# Patient Record
Sex: MALE | Race: WHITE | Employment: OTHER | URBAN - METROPOLITAN AREA
[De-identification: names, ages, dates, MRNs, and addresses within clinical notes are randomized per-mention and may not be internally consistent; named-entity substitution may affect disease eponyms.]

---

## 2017-02-17 PROBLEM — I25.10 CORONARY ARTERY DISEASE INVOLVING NATIVE CORONARY ARTERY OF NATIVE HEART WITHOUT ANGINA PECTORIS: Status: ACTIVE | Noted: 2017-02-17

## 2017-02-22 ENCOUNTER — HOSPITAL ENCOUNTER (OUTPATIENT)
Dept: CV DIAGNOSTICS | Facility: HOSPITAL | Age: 72
Discharge: HOME | End: 2017-02-22
Attending: INTERNAL MEDICINE
Payer: MEDICARE

## 2017-02-22 DIAGNOSIS — I25.10 CORONARY ARTERY DISEASE INVOLVING NATIVE CORONARY ARTERY OF NATIVE HEART WITHOUT ANGINA PECTORIS: ICD-10-CM

## 2017-02-22 DIAGNOSIS — G20 PARKINSON'S DISEASE (HCC): ICD-10-CM

## 2017-02-22 PROCEDURE — 78452 HT MUSCLE IMAGE SPECT MULT: CPT | Performed by: INTERNAL MEDICINE

## 2017-02-22 PROCEDURE — 93017 CV STRESS TEST TRACING ONLY: CPT

## 2017-02-22 PROCEDURE — 78452 HT MUSCLE IMAGE SPECT MULT: CPT

## 2017-02-22 PROCEDURE — 93018 CV STRESS TEST I&R ONLY: CPT | Performed by: INTERNAL MEDICINE

## 2017-06-23 ENCOUNTER — LAB ENCOUNTER (OUTPATIENT)
Dept: LAB | Age: 72
End: 2017-06-23
Attending: ALLERGY & IMMUNOLOGY
Payer: MEDICARE

## 2017-06-23 DIAGNOSIS — J32.9 RECURRENT SINUSITIS: Primary | ICD-10-CM

## 2017-06-23 PROCEDURE — 36415 COLL VENOUS BLD VENIPUNCTURE: CPT

## 2017-06-23 PROCEDURE — 85025 COMPLETE CBC W/AUTO DIFF WBC: CPT

## 2017-06-23 PROCEDURE — 82784 ASSAY IGA/IGD/IGG/IGM EACH: CPT

## 2017-08-03 ENCOUNTER — PATIENT MESSAGE (OUTPATIENT)
Dept: FAMILY MEDICINE CLINIC | Facility: CLINIC | Age: 72
End: 2017-08-03

## 2017-08-03 RX ORDER — LOSARTAN POTASSIUM 25 MG/1
25 TABLET ORAL DAILY
Qty: 30 TABLET | Refills: 0 | Status: SHIPPED | OUTPATIENT
Start: 2017-08-03 | End: 2017-08-03

## 2017-08-03 RX ORDER — LOSARTAN POTASSIUM 25 MG/1
TABLET ORAL
Qty: 90 TABLET | Refills: 0 | Status: SHIPPED | OUTPATIENT
Start: 2017-08-03 | End: 2017-11-03

## 2017-08-03 NOTE — TELEPHONE ENCOUNTER
From: Kiera Marroquin  To: Concetta Haines MD  Sent: 8/3/2017 11:55 AM CDT  Subject: Non-Urgent Medical Question    Dr. Guzman Segovia,    We just came back from a visit with [facility]'s Allergist regarding his chronic cough and he suggested that I ask you if it woul

## 2017-08-03 NOTE — TELEPHONE ENCOUNTER
Certainly could be. I will call in an alternative  We have an appt in 1 mo. Will discuss then  Stop the lisinopril right now.

## 2017-08-03 NOTE — TELEPHONE ENCOUNTER
Received refill request for Losartan Potassium 25mg from [facility] in [place]. This medication was just ordered today, 8/3/17,  #30 with 0 additional refills by Lexi Souza as a replacement for lisinopril that may have been causing a cough.   Tim Aguiar has a M

## 2017-08-08 ENCOUNTER — PATIENT OUTREACH (OUTPATIENT)
Dept: CASE MANAGEMENT | Age: 72
End: 2017-08-08

## 2017-08-08 NOTE — PROGRESS NOTES
Spoke to pt's wife (on HIPAA) to intro CCM. Pt's wife is interested in having pt enroll in CCM, but wants to be able to go over the information with him so he can have more of a say in his care before he would verbally consent.   Information being sent in

## 2017-08-21 DIAGNOSIS — E11.29 TYPE 2 DIABETES MELLITUS WITH MICROALBUMINURIA, WITHOUT LONG-TERM CURRENT USE OF INSULIN (HCC): ICD-10-CM

## 2017-08-21 DIAGNOSIS — R80.9 TYPE 2 DIABETES MELLITUS WITH MICROALBUMINURIA, WITHOUT LONG-TERM CURRENT USE OF INSULIN (HCC): ICD-10-CM

## 2017-08-21 RX ORDER — LISINOPRIL 2.5 MG/1
TABLET ORAL
Qty: 90 TABLET | Refills: 0 | Status: SHIPPED | OUTPATIENT
Start: 2017-08-21 | End: 2017-08-30

## 2017-08-21 NOTE — TELEPHONE ENCOUNTER
Pending Prescriptions Disp Refills    LISINOPRIL 2.5 MG Oral Tab [Pharmacy Med Name: LISINOPRIL 2.5MG TABLETS] 90 tablet 0     Sig: TAKE 1 TABLET(2.5 MG) BY MOUTH DAILY           LOV 8/30/2016    Future Appointments  Date Time Provider Peewee Wilkinson

## 2017-08-22 ENCOUNTER — PATIENT OUTREACH (OUTPATIENT)
Dept: CASE MANAGEMENT | Age: 72
End: 2017-08-22

## 2017-08-23 ENCOUNTER — PATIENT OUTREACH (OUTPATIENT)
Dept: CASE MANAGEMENT | Age: 72
End: 2017-08-23

## 2017-08-23 NOTE — PROGRESS NOTES
Attempted to contact pt to introduce myself as Chronic Care Manager, no answer left detailed message for pt to call back. Also notified that I was mailing a letter with my information for pt's records.    Will call back in a couple days to schedule assessme

## 2017-08-28 ENCOUNTER — TELEPHONE (OUTPATIENT)
Dept: FAMILY MEDICINE CLINIC | Facility: CLINIC | Age: 72
End: 2017-08-28

## 2017-08-28 NOTE — TELEPHONE ENCOUNTER
Pt's spouse has answered Annual Assessment Form  (pt has Parkinson's) appt is w/Dr Colleen Brown on 9/1/17 (form in blue tickler in [address] [place] [address])

## 2017-09-01 ENCOUNTER — OFFICE VISIT (OUTPATIENT)
Dept: FAMILY MEDICINE CLINIC | Facility: CLINIC | Age: 72
End: 2017-09-01

## 2017-09-01 VITALS
BODY MASS INDEX: 22.88 KG/M2 | HEART RATE: 76 BPM | TEMPERATURE: 98 F | RESPIRATION RATE: 16 BRPM | SYSTOLIC BLOOD PRESSURE: 120 MMHG | WEIGHT: 151 LBS | DIASTOLIC BLOOD PRESSURE: 82 MMHG | HEIGHT: 68 IN

## 2017-09-01 DIAGNOSIS — G20 DEMENTIA DUE TO PARKINSON'S DISEASE WITHOUT BEHAVIORAL DISTURBANCE (HCC): ICD-10-CM

## 2017-09-01 DIAGNOSIS — I10 ESSENTIAL HYPERTENSION, BENIGN: ICD-10-CM

## 2017-09-01 DIAGNOSIS — F19.951 DRUG-INDUCED HALLUCINOSIS (HCC): ICD-10-CM

## 2017-09-01 DIAGNOSIS — Z13.31 DEPRESSION SCREENING: ICD-10-CM

## 2017-09-01 DIAGNOSIS — F02.80 DEMENTIA DUE TO PARKINSON'S DISEASE WITHOUT BEHAVIORAL DISTURBANCE (HCC): ICD-10-CM

## 2017-09-01 DIAGNOSIS — F22: ICD-10-CM

## 2017-09-01 DIAGNOSIS — R80.9 TYPE 2 DIABETES MELLITUS WITH MICROALBUMINURIA, WITHOUT LONG-TERM CURRENT USE OF INSULIN (HCC): ICD-10-CM

## 2017-09-01 DIAGNOSIS — I25.10 CORONARY ARTERY DISEASE INVOLVING NATIVE CORONARY ARTERY OF NATIVE HEART WITHOUT ANGINA PECTORIS: ICD-10-CM

## 2017-09-01 DIAGNOSIS — G20 PARKINSON'S DISEASE (HCC): ICD-10-CM

## 2017-09-01 DIAGNOSIS — I70.0 THORACIC AORTA ATHEROSCLEROSIS (HCC): ICD-10-CM

## 2017-09-01 DIAGNOSIS — Z00.00 ENCOUNTER FOR ANNUAL HEALTH EXAMINATION: Primary | ICD-10-CM

## 2017-09-01 DIAGNOSIS — E11.29 TYPE 2 DIABETES MELLITUS WITH MICROALBUMINURIA, WITHOUT LONG-TERM CURRENT USE OF INSULIN (HCC): ICD-10-CM

## 2017-09-01 LAB
CARTRIDGE LOT#: 741 NUMERIC
HEMOGLOBIN A1C: 6.6 % (ref 4.3–5.6)

## 2017-09-01 PROCEDURE — G0439 PPPS, SUBSEQ VISIT: HCPCS | Performed by: FAMILY MEDICINE

## 2017-09-01 PROCEDURE — G0444 DEPRESSION SCREEN ANNUAL: HCPCS | Performed by: FAMILY MEDICINE

## 2017-09-01 NOTE — PROGRESS NOTES
HPI:   Etta Montiel is a 66year old male who presents for a Medicare Subsequent Annual Wellness visit (Pt already had Initial Annual Wellness). Preventative Screening  Colonoscopy - never had c-scope.   With co morbidities, interested in stool jin native coronary artery of native heart without angina pectoris      Last Cholesterol Labs:     Lab Results  Component Value Date   CHOLEST 101 08/26/2016   HDL 58 08/26/2016   LDL 30 08/26/2016   TRIG 66 08/26/2016          Last Chemistry Labs:     Lab Res (6/29/2012); Anemia due to blood loss, chronic (12/8/2012); Asthma (12/2/2012); Coronary artery disease (5/6/2014); Dementia in [facility]'s disease ([facility] (3/4/2013); Esophageal reflux (6/29/2012); Essential hypertension (6/29/2012);  Gastrointestinal bleed Assessment  (Required for AWV/SWV)    Finger Rub - passed bilterally      Visual Acuity              Both Eyes Visual Acuity: Uncorrected Both Eyes Chart Acuity: 20/100          General appearance: alert, appears stated age, cooperative and with wife.   she on chart review. Risk factor control - BP, lipid control. 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 8. Drug-induced hallucinosis ([facility] [place] [facility]  As above.      9. Coronary artery disease involving native coronary artery of native heart wi without help    Taking medications as prescribed: Need some help    Are you able to afford your medications?: Yes    Hearing Problems?: Yes     Functional Status     Hearing Problems?: Yes    Vision Problems? : Yes    Difficulty walking?: Yes    Difficulty HgbA1C (%)   Date Value   08/26/2016 6.3 (H)       No flowsheet data found.     Fasting Blood Sugar (FSB)Annually   Glucose (mg/dL)   Date Value   08/26/2016 133 (H)   ----------  GLUCOSE (mg/dL)   Date Value   07/09/2014 132 (H)   03/01/2011 134 (H) anticonvulsants.)    Potassium  Annually Potassium (mmol/L)   Date Value   08/26/2016 3.9     POTASSIUM (mmol/L)   Date Value   03/10/2014 3.9   12/14/2012 4.1   03/01/2011 4.1    No flowsheet data found.     Creatinine  Annually Creatinine, Serum (mg/dL)

## 2017-09-01 NOTE — PATIENT INSTRUCTIONS
Javier Kim's SCREENING SCHEDULE   Tests on this list are recommended by your physician but may not be covered, or covered at this frequency, by your insurer. Please check with your insurance carrier before scheduling to verify coverage.     Joi Moore Welcome to Medicare Visit    Abdominal aortic aneurysm screening (once between ages 73-68)  No results found for this or any previous visit.  Limited to patients who meet one of the following criteria:   • Men who are 73-68 years old and have smoked more th once after your 65th birthday    Hepatitis B for Moderate/High Risk No orders found for this or any previous visit.  Medium/high risk factors:   End-stage renal disease   Hemophiliacs who received Factor VIII or IX concentrates   Clients of institutions for

## 2017-09-05 ENCOUNTER — PATIENT OUTREACH (OUTPATIENT)
Dept: CASE MANAGEMENT | Age: 72
End: 2017-09-05

## 2017-09-05 NOTE — PROGRESS NOTES
Attempted to contact pt to schedule assessment, no answer left detailed message for pt to call back. Will try again in a couple days.     Total time spent with patient including chart review: 2  Time spent with patient this month: 2    Total time spent with

## 2017-09-06 ENCOUNTER — TELEPHONE (OUTPATIENT)
Dept: FAMILY MEDICINE CLINIC | Facility: CLINIC | Age: 72
End: 2017-09-06

## 2017-09-06 NOTE — TELEPHONE ENCOUNTER
Received incoming call from Dr. Lyn [facility] office requesting recent labs faxed to [phone]. Spoke to patient's spouse who provided verbal approval to fax over lab results. A1c results faxed for appointment tomorrow with Dr. Dheeraj Salamanca.

## 2017-09-11 ENCOUNTER — PATIENT OUTREACH (OUTPATIENT)
Dept: CASE MANAGEMENT | Age: 72
End: 2017-09-11

## 2017-09-11 NOTE — PROGRESS NOTES
Spoke to pt's wife Jesus Silver ([facility]) scheduled assessment for Thursday.    Total time spent with patient including chart review: 2  Time spent with patient this month: 4    Total time spent with communication and chart review this month to date: 4

## 2017-09-14 ENCOUNTER — PATIENT OUTREACH (OUTPATIENT)
Dept: CASE MANAGEMENT | Age: 72
End: 2017-09-14

## 2017-09-14 DIAGNOSIS — E11.29 TYPE 2 DIABETES MELLITUS WITH MICROALBUMINURIA, WITHOUT LONG-TERM CURRENT USE OF INSULIN (HCC): ICD-10-CM

## 2017-09-14 DIAGNOSIS — G20 PARKINSON'S DISEASE (HCC): ICD-10-CM

## 2017-09-14 DIAGNOSIS — G20 DEMENTIA DUE TO PARKINSON'S DISEASE WITHOUT BEHAVIORAL DISTURBANCE (HCC): ICD-10-CM

## 2017-09-14 DIAGNOSIS — F02.80 DEMENTIA DUE TO PARKINSON'S DISEASE WITHOUT BEHAVIORAL DISTURBANCE (HCC): ICD-10-CM

## 2017-09-14 DIAGNOSIS — I10 ESSENTIAL HYPERTENSION, BENIGN: ICD-10-CM

## 2017-09-14 DIAGNOSIS — R80.9 TYPE 2 DIABETES MELLITUS WITH MICROALBUMINURIA, WITHOUT LONG-TERM CURRENT USE OF INSULIN (HCC): ICD-10-CM

## 2017-09-14 PROCEDURE — 99490 CHRNC CARE MGMT STAFF 1ST 20: CPT

## 2017-09-14 RX ORDER — L.RHAMNOSUS/B.ANIMALIS(LACTIS) 3B CELL
CAPSULE ORAL DAILY
COMMUNITY
End: 2018-09-04

## 2017-09-14 NOTE — PROGRESS NOTES
Social support:  • Do you have someone you can turn to when you are very stressed and or need help? Yes   o Who? Wife   • Do you live with someone? Yes   • Do you have someone you check in with or talk to on a regular basis?  Yes   • Are you the primary car health? Parkinson's     • What steps do you think you could take to work on this? More movement. • How about lets take some baby steps.       • What is one baby step you are willing to work on? 2 days a week of extra exercises like stretching     • Exam limits his activities and exercise. Pt does have a [employer] bike and a stretching chair usually uses the bike but is not consistent Nataly admits she could be more addiment and consistent and reports she will.  Nataly reports pt's speech has worsened and telephone communication, care plan updates where needed, and education. Provided acknowledgment and validation to patient's concerns.    Monthly Minute Total including today: 37    Physical assessment, complete health history, and need for CCM established b

## 2017-10-17 ENCOUNTER — PATIENT OUTREACH (OUTPATIENT)
Dept: CASE MANAGEMENT | Age: 72
End: 2017-10-17

## 2017-10-17 DIAGNOSIS — I10 ESSENTIAL HYPERTENSION, BENIGN: ICD-10-CM

## 2017-10-17 DIAGNOSIS — E11.29 TYPE 2 DIABETES MELLITUS WITH MICROALBUMINURIA, WITHOUT LONG-TERM CURRENT USE OF INSULIN (HCC): ICD-10-CM

## 2017-10-17 DIAGNOSIS — G20 DEMENTIA DUE TO PARKINSON'S DISEASE WITHOUT BEHAVIORAL DISTURBANCE (HCC): ICD-10-CM

## 2017-10-17 DIAGNOSIS — F02.80 DEMENTIA DUE TO PARKINSON'S DISEASE WITHOUT BEHAVIORAL DISTURBANCE (HCC): ICD-10-CM

## 2017-10-17 DIAGNOSIS — R80.9 TYPE 2 DIABETES MELLITUS WITH MICROALBUMINURIA, WITHOUT LONG-TERM CURRENT USE OF INSULIN (HCC): ICD-10-CM

## 2017-10-17 DIAGNOSIS — G20 PARKINSON'S DISEASE (HCC): ICD-10-CM

## 2017-10-17 PROCEDURE — 99490 CHRNC CARE MGMT STAFF 1ST 20: CPT

## 2017-10-17 NOTE — PROGRESS NOTES
10/17/2017  Spoke to Elisabeth Montes wife Nataly at length about CCM, current care plan and reviewed meds and compliance.  Reviewed Patient Active Problem List:     Vitamin D deficiency     Rheumatic fever without mention of heart involvement     Esophageal reflux hours of walking. Parkinson's: Tin Mora reports they are managing. Meds: Detailed medication review with Merritt Bennett. Discussed with Merritt Bennett if any potential barriers are present that could prevent compliance with medication regimen.  At this time, no barriers re

## 2017-11-03 RX ORDER — LOSARTAN POTASSIUM 25 MG/1
TABLET ORAL
Qty: 90 TABLET | Refills: 1 | Status: SHIPPED | OUTPATIENT
Start: 2017-11-03 | End: 2018-03-19

## 2017-11-21 ENCOUNTER — PATIENT OUTREACH (OUTPATIENT)
Dept: CASE MANAGEMENT | Age: 72
End: 2017-11-21

## 2017-11-21 DIAGNOSIS — G20 PARKINSON'S DISEASE (HCC): ICD-10-CM

## 2017-11-21 DIAGNOSIS — R80.9 TYPE 2 DIABETES MELLITUS WITH MICROALBUMINURIA, WITHOUT LONG-TERM CURRENT USE OF INSULIN (HCC): ICD-10-CM

## 2017-11-21 DIAGNOSIS — I10 ESSENTIAL HYPERTENSION, BENIGN: ICD-10-CM

## 2017-11-21 DIAGNOSIS — F02.80 DEMENTIA DUE TO PARKINSON'S DISEASE WITHOUT BEHAVIORAL DISTURBANCE (HCC): ICD-10-CM

## 2017-11-21 DIAGNOSIS — E11.29 TYPE 2 DIABETES MELLITUS WITH MICROALBUMINURIA, WITHOUT LONG-TERM CURRENT USE OF INSULIN (HCC): ICD-10-CM

## 2017-11-21 DIAGNOSIS — G20 DEMENTIA DUE TO PARKINSON'S DISEASE WITHOUT BEHAVIORAL DISTURBANCE (HCC): ICD-10-CM

## 2017-11-21 PROCEDURE — 99490 CHRNC CARE MGMT STAFF 1ST 20: CPT

## 2017-11-21 RX ORDER — QUETIAPINE 200 MG/1
TABLET, EXTENDED RELEASE ORAL
Refills: 3 | COMMUNITY
Start: 2017-10-21 | End: 2018-09-04

## 2017-11-21 RX ORDER — IPRATROPIUM BROMIDE 42 UG/1
SPRAY, METERED NASAL
Refills: 3 | COMMUNITY
Start: 2017-10-22

## 2017-11-21 RX ORDER — FLUTICASONE PROPIONATE 50 MCG
SPRAY, SUSPENSION (ML) NASAL
Refills: 6 | COMMUNITY
Start: 2017-10-18 | End: 2017-11-21 | Stop reason: ALTCHOICE

## 2017-11-21 NOTE — PROGRESS NOTES
11/21/2017  Spoke to Juan Manuel Broussard at length about CCM, current care plan and reviewed meds and compliance.  Reviewed Patient Active Problem List:     Vitamin D deficiency     Rheumatic fever without mention of heart involvement     Esophageal reflux     Essential FOLLOW UP with Darius Guidry MD [facility] ([place] at [address])    Mar 02, 2018 11:40 AM CST FOLLOW UP with Chris Atkinson MD  CARDIOLOGY [place] at [address] [facility]  [place]

## 2017-12-22 ENCOUNTER — PATIENT OUTREACH (OUTPATIENT)
Dept: CASE MANAGEMENT | Age: 72
End: 2017-12-22

## 2017-12-28 ENCOUNTER — PATIENT OUTREACH (OUTPATIENT)
Dept: CASE MANAGEMENT | Age: 72
End: 2017-12-28

## 2017-12-28 DIAGNOSIS — G20 DEMENTIA DUE TO PARKINSON'S DISEASE WITHOUT BEHAVIORAL DISTURBANCE (HCC): ICD-10-CM

## 2017-12-28 DIAGNOSIS — R80.9 TYPE 2 DIABETES MELLITUS WITH MICROALBUMINURIA, WITHOUT LONG-TERM CURRENT USE OF INSULIN (HCC): ICD-10-CM

## 2017-12-28 DIAGNOSIS — I10 ESSENTIAL HYPERTENSION, BENIGN: ICD-10-CM

## 2017-12-28 DIAGNOSIS — E11.29 TYPE 2 DIABETES MELLITUS WITH MICROALBUMINURIA, WITHOUT LONG-TERM CURRENT USE OF INSULIN (HCC): ICD-10-CM

## 2017-12-28 DIAGNOSIS — F02.80 DEMENTIA DUE TO PARKINSON'S DISEASE WITHOUT BEHAVIORAL DISTURBANCE (HCC): ICD-10-CM

## 2017-12-28 PROCEDURE — 99490 CHRNC CARE MGMT STAFF 1ST 20: CPT

## 2017-12-28 NOTE — PROGRESS NOTES
12/28/2017  Spoke to [place] at length about CCM, current care plan and reviewed meds and compliance.  Reviewed Patient Active Problem List:     Vitamin D deficiency     Rheumatic fever without mention of heart involvement     Esophageal reflux     Essential Melanie Gamboaiksgatakeri  Urology  [place] at [address] [facility] [address] at [address] [facility]  [facility]  [address] [place] [phone]

## 2018-01-18 ENCOUNTER — PATIENT OUTREACH (OUTPATIENT)
Dept: CASE MANAGEMENT | Age: 73
End: 2018-01-18

## 2018-01-18 DIAGNOSIS — F02.80 DEMENTIA DUE TO PARKINSON'S DISEASE WITHOUT BEHAVIORAL DISTURBANCE (HCC): ICD-10-CM

## 2018-01-18 DIAGNOSIS — G20 PARKINSON'S DISEASE (HCC): ICD-10-CM

## 2018-01-18 DIAGNOSIS — G20 DEMENTIA DUE TO PARKINSON'S DISEASE WITHOUT BEHAVIORAL DISTURBANCE (HCC): ICD-10-CM

## 2018-01-18 DIAGNOSIS — R80.9 TYPE 2 DIABETES MELLITUS WITH MICROALBUMINURIA, WITHOUT LONG-TERM CURRENT USE OF INSULIN (HCC): ICD-10-CM

## 2018-01-18 DIAGNOSIS — I10 ESSENTIAL HYPERTENSION, BENIGN: ICD-10-CM

## 2018-01-18 DIAGNOSIS — E11.29 TYPE 2 DIABETES MELLITUS WITH MICROALBUMINURIA, WITHOUT LONG-TERM CURRENT USE OF INSULIN (HCC): ICD-10-CM

## 2018-01-18 PROCEDURE — 99490 CHRNC CARE MGMT STAFF 1ST 20: CPT

## 2018-01-18 NOTE — PROGRESS NOTES
Spoke to Fernando's wife Nataly at length about CCM, current care plan and reviewed meds and compliance.  Patient Active Problem List:     Vitamin D deficiency     Rheumatic fever without mention of heart involvement     Esophageal reflux     Essential hyperte [id number]  [phone]        Meds: Detailed medication review with [place]. Discussed with [place] if any potential barriers are present that could prevent compliance with medication regimen. At this time, no barriers reported.  [place] reports is stable on al

## 2018-02-13 ENCOUNTER — PATIENT OUTREACH (OUTPATIENT)
Dept: CASE MANAGEMENT | Age: 73
End: 2018-02-13

## 2018-02-13 DIAGNOSIS — I10 ESSENTIAL HYPERTENSION, BENIGN: ICD-10-CM

## 2018-02-13 DIAGNOSIS — F02.80 DEMENTIA DUE TO PARKINSON'S DISEASE WITHOUT BEHAVIORAL DISTURBANCE (HCC): ICD-10-CM

## 2018-02-13 DIAGNOSIS — G20 DEMENTIA DUE TO PARKINSON'S DISEASE WITHOUT BEHAVIORAL DISTURBANCE (HCC): ICD-10-CM

## 2018-02-13 PROCEDURE — 99490 CHRNC CARE MGMT STAFF 1ST 20: CPT

## 2018-02-13 NOTE — PROGRESS NOTES
2/13/2018  Spoke to [facility] for CCM. Updates to patient care team/comments: n/a   Patient reported changes in medications: np changes reported per wife. Med Adherence  Comment: per pt's wife Joana Valencia pt taking all medications as prescribed.       Health today: 20    Physical assessment, complete health history, and need for CCM established by Mike Escobar MD.

## 2018-03-13 ENCOUNTER — PATIENT OUTREACH (OUTPATIENT)
Dept: CASE MANAGEMENT | Age: 73
End: 2018-03-13

## 2018-03-13 NOTE — PROGRESS NOTES
Attempted to contact pt no answer left detailed message for pt to call back. Will try again in a couple days.    Total time spent with patient including chart review: 2  Time spent with patient this month: 2    Total time spent with communication and chart

## 2018-03-21 ENCOUNTER — PATIENT OUTREACH (OUTPATIENT)
Dept: CASE MANAGEMENT | Age: 73
End: 2018-03-21

## 2018-03-21 DIAGNOSIS — I10 ESSENTIAL HYPERTENSION, BENIGN: ICD-10-CM

## 2018-03-21 DIAGNOSIS — G20 PARKINSON'S DISEASE (HCC): ICD-10-CM

## 2018-03-21 DIAGNOSIS — E11.29 TYPE 2 DIABETES MELLITUS WITH MICROALBUMINURIA, WITHOUT LONG-TERM CURRENT USE OF INSULIN (HCC): ICD-10-CM

## 2018-03-21 DIAGNOSIS — R80.9 TYPE 2 DIABETES MELLITUS WITH MICROALBUMINURIA, WITHOUT LONG-TERM CURRENT USE OF INSULIN (HCC): ICD-10-CM

## 2018-03-21 PROCEDURE — 99490 CHRNC CARE MGMT STAFF 1ST 20: CPT

## 2018-04-02 ENCOUNTER — OFFICE VISIT (OUTPATIENT)
Dept: FAMILY MEDICINE CLINIC | Facility: CLINIC | Age: 73
End: 2018-04-02

## 2018-04-02 VITALS
HEART RATE: 92 BPM | DIASTOLIC BLOOD PRESSURE: 78 MMHG | TEMPERATURE: 99 F | BODY MASS INDEX: 23.49 KG/M2 | WEIGHT: 155 LBS | SYSTOLIC BLOOD PRESSURE: 131 MMHG | OXYGEN SATURATION: 93 % | RESPIRATION RATE: 16 BRPM | HEIGHT: 68 IN

## 2018-04-02 DIAGNOSIS — R80.9 TYPE 2 DIABETES MELLITUS WITH MICROALBUMINURIA, WITHOUT LONG-TERM CURRENT USE OF INSULIN (HCC): ICD-10-CM

## 2018-04-02 DIAGNOSIS — I10 ESSENTIAL HYPERTENSION, BENIGN: Primary | ICD-10-CM

## 2018-04-02 DIAGNOSIS — E11.29 TYPE 2 DIABETES MELLITUS WITH MICROALBUMINURIA, WITHOUT LONG-TERM CURRENT USE OF INSULIN (HCC): ICD-10-CM

## 2018-04-02 DIAGNOSIS — I25.10 CORONARY ARTERY DISEASE INVOLVING NATIVE CORONARY ARTERY OF NATIVE HEART WITHOUT ANGINA PECTORIS: ICD-10-CM

## 2018-04-02 PROCEDURE — 99213 OFFICE O/P EST LOW 20 MIN: CPT | Performed by: FAMILY MEDICINE

## 2018-04-02 RX ORDER — QUETIAPINE 200 MG/1
TABLET, FILM COATED ORAL
Refills: 3 | COMMUNITY
Start: 2018-03-23

## 2018-04-02 NOTE — PROGRESS NOTES
No chief complaint on file. HPI:   Jori Barrientos is a 67year old male who presents to the office for BP check up. BP at home has been elevated recently. BP at home week of 3/22 was 158 at the cardio office ([facility]), 138 at home anad 157.    The PANEL;  Susy Sotomayor M.D.   EM[facility] 3  04/02/18

## 2018-04-24 ENCOUNTER — PATIENT OUTREACH (OUTPATIENT)
Dept: CASE MANAGEMENT | Age: 73
End: 2018-04-24

## 2018-04-24 DIAGNOSIS — I10 ESSENTIAL HYPERTENSION, BENIGN: ICD-10-CM

## 2018-04-24 DIAGNOSIS — I25.10 CORONARY ARTERY DISEASE INVOLVING NATIVE CORONARY ARTERY OF NATIVE HEART WITHOUT ANGINA PECTORIS: ICD-10-CM

## 2018-04-24 DIAGNOSIS — E11.29 TYPE 2 DIABETES MELLITUS WITH MICROALBUMINURIA, WITHOUT LONG-TERM CURRENT USE OF INSULIN (HCC): ICD-10-CM

## 2018-04-24 DIAGNOSIS — G20 DEMENTIA DUE TO PARKINSON'S DISEASE WITHOUT BEHAVIORAL DISTURBANCE (HCC): ICD-10-CM

## 2018-04-24 DIAGNOSIS — G20 PARKINSON'S DISEASE (HCC): ICD-10-CM

## 2018-04-24 DIAGNOSIS — F02.80 DEMENTIA DUE TO PARKINSON'S DISEASE WITHOUT BEHAVIORAL DISTURBANCE (HCC): ICD-10-CM

## 2018-04-24 DIAGNOSIS — R80.9 TYPE 2 DIABETES MELLITUS WITH MICROALBUMINURIA, WITHOUT LONG-TERM CURRENT USE OF INSULIN (HCC): ICD-10-CM

## 2018-04-24 PROCEDURE — 99490 CHRNC CARE MGMT STAFF 1ST 20: CPT

## 2018-04-24 NOTE — PROGRESS NOTES
4/24/2018  Spoke to [employer] wife Nataly for CCM. Updates to patient care team/comments: n/a. Patient reported changes in medications: per Nataly pt's wife no changes. Med Adherence  Comment: per pt's wife he is taking all medications as prescribed. Stated)        - continue his diet and exercise routines. · Patient Reported Barriers And Concerns: Parkinson. - Plan for overcoming barriers: continue exercising and staying motivated.      Patient agrees to goal action plan.   Self-Man

## 2018-04-26 ENCOUNTER — PATIENT MESSAGE (OUTPATIENT)
Dept: FAMILY MEDICINE CLINIC | Facility: CLINIC | Age: 73
End: 2018-04-26

## 2018-04-26 RX ORDER — HYDROCHLOROTHIAZIDE 25 MG/1
25 TABLET ORAL DAILY
Qty: 90 TABLET | Refills: 1 | Status: SHIPPED | OUTPATIENT
Start: 2018-04-26 | End: 2018-10-17

## 2018-04-26 NOTE — TELEPHONE ENCOUNTER
From: Julio Cesar Holden  To: Clementina Moore MD  Sent: 4/26/2018 8:51 AM CDT  Subject: Other    Dr. aMrio Larry has been on the increased dosage of Lorstan, 25mg to 50 mg., for about two weeks now.  Aside from one reading of 125/87 85 on 4/17 his pressure

## 2018-04-27 RX ORDER — LOSARTAN POTASSIUM 25 MG/1
TABLET ORAL
Qty: 90 TABLET | Refills: 1 | Status: SHIPPED | OUTPATIENT
Start: 2018-04-27 | End: 2018-05-24 | Stop reason: DRUGHIGH

## 2018-05-24 ENCOUNTER — PATIENT OUTREACH (OUTPATIENT)
Dept: CASE MANAGEMENT | Age: 73
End: 2018-05-24

## 2018-05-24 DIAGNOSIS — I25.10 CORONARY ARTERY DISEASE INVOLVING NATIVE CORONARY ARTERY OF NATIVE HEART WITHOUT ANGINA PECTORIS: ICD-10-CM

## 2018-05-24 DIAGNOSIS — R80.9 TYPE 2 DIABETES MELLITUS WITH MICROALBUMINURIA, WITHOUT LONG-TERM CURRENT USE OF INSULIN (HCC): ICD-10-CM

## 2018-05-24 DIAGNOSIS — G20 PARKINSON'S DISEASE (HCC): ICD-10-CM

## 2018-05-24 DIAGNOSIS — F02.80 DEMENTIA DUE TO PARKINSON'S DISEASE WITHOUT BEHAVIORAL DISTURBANCE (HCC): ICD-10-CM

## 2018-05-24 DIAGNOSIS — E11.29 TYPE 2 DIABETES MELLITUS WITH MICROALBUMINURIA, WITHOUT LONG-TERM CURRENT USE OF INSULIN (HCC): ICD-10-CM

## 2018-05-24 DIAGNOSIS — G20 DEMENTIA DUE TO PARKINSON'S DISEASE WITHOUT BEHAVIORAL DISTURBANCE (HCC): ICD-10-CM

## 2018-05-24 DIAGNOSIS — I10 ESSENTIAL HYPERTENSION, BENIGN: ICD-10-CM

## 2018-05-24 PROCEDURE — 99490 CHRNC CARE MGMT STAFF 1ST 20: CPT

## 2018-05-24 NOTE — PROGRESS NOTES
5/24/2018  Spoke to [employer] wife Nataly for CCM. Updates to patient care team/comments: yes. Patient reported changes in medications: per pt's wife his Losartan was increased by Dr. Galina Allen from 25 mg to 50 mg.  Both were on medication list but we upd exercise classes at [address] [place] [address].   In addition to exercise programs, a Parkinson’s disease support group is held every fourth Thursday of the month from 1-2:30 p.m. in the third-floor [facility] at [facility] [place] [facility]. Notified July

## 2018-06-18 ENCOUNTER — PATIENT OUTREACH (OUTPATIENT)
Dept: CASE MANAGEMENT | Age: 73
End: 2018-06-18

## 2018-06-18 NOTE — PROGRESS NOTES
Attempted to contact pt's wife Flavia Mccauley no answer left detailed message for pt to call back.    Total time spent with patient including chart review: 2  Time spent with patient this month: 2    Total time spent with communication and chart review this month t

## 2018-06-28 ENCOUNTER — PATIENT OUTREACH (OUTPATIENT)
Dept: CASE MANAGEMENT | Age: 73
End: 2018-06-28

## 2018-07-08 DIAGNOSIS — I10 ESSENTIAL HYPERTENSION, BENIGN: ICD-10-CM

## 2018-07-08 RX ORDER — LOSARTAN POTASSIUM 50 MG/1
TABLET ORAL
Qty: 90 TABLET | Refills: 0 | Status: CANCELLED | OUTPATIENT
Start: 2018-07-08

## 2018-07-10 NOTE — TELEPHONE ENCOUNTER
Left message for Pt on home phone explaining Pt is past due for labs. Asked in message that Pt call office to discuss. Pharmacy requesting refill on higher dose of Losartan, but Pt was suppose to check labs two weeks after it was increased.

## 2018-07-12 ENCOUNTER — APPOINTMENT (OUTPATIENT)
Dept: LAB | Age: 73
End: 2018-07-12
Attending: FAMILY MEDICINE
Payer: MEDICARE

## 2018-07-12 DIAGNOSIS — E11.29 TYPE 2 DIABETES MELLITUS WITH MICROALBUMINURIA, WITHOUT LONG-TERM CURRENT USE OF INSULIN (HCC): ICD-10-CM

## 2018-07-12 DIAGNOSIS — I10 ESSENTIAL HYPERTENSION, BENIGN: ICD-10-CM

## 2018-07-12 DIAGNOSIS — R80.9 TYPE 2 DIABETES MELLITUS WITH MICROALBUMINURIA, WITHOUT LONG-TERM CURRENT USE OF INSULIN (HCC): ICD-10-CM

## 2018-07-12 DIAGNOSIS — I25.10 CORONARY ARTERY DISEASE INVOLVING NATIVE CORONARY ARTERY OF NATIVE HEART WITHOUT ANGINA PECTORIS: ICD-10-CM

## 2018-07-12 LAB
ALBUMIN SERPL-MCNC: 3.9 G/DL (ref 3.5–4.8)
ALP LIVER SERPL-CCNC: 95 U/L (ref 45–117)
ALT SERPL-CCNC: 12 U/L (ref 17–63)
AST SERPL-CCNC: 13 U/L (ref 15–41)
BILIRUB SERPL-MCNC: 0.8 MG/DL (ref 0.1–2)
BUN BLD-MCNC: 19 MG/DL (ref 8–20)
CALCIUM BLD-MCNC: 9.2 MG/DL (ref 8.3–10.3)
CHLORIDE: 105 MMOL/L (ref 101–111)
CO2: 30 MMOL/L (ref 22–32)
CREAT BLD-MCNC: 1.18 MG/DL (ref 0.7–1.3)
CREAT UR-SCNC: 71.1 MG/DL
EST. AVERAGE GLUCOSE BLD GHB EST-MCNC: 148 MG/DL (ref 68–126)
GLUCOSE BLD-MCNC: 148 MG/DL (ref 70–99)
HBA1C MFR BLD HPLC: 6.8 % (ref ?–5.7)
M PROTEIN MFR SERPL ELPH: 7.9 G/DL (ref 6.1–8.3)
MICROALBUMIN UR-MCNC: 1.82 MG/DL
MICROALBUMIN/CREAT 24H UR-RTO: 25.6 UG/MG (ref ?–30)
POTASSIUM SERPL-SCNC: 3.8 MMOL/L (ref 3.6–5.1)
SODIUM SERPL-SCNC: 142 MMOL/L (ref 136–144)

## 2018-07-12 PROCEDURE — 82043 UR ALBUMIN QUANTITATIVE: CPT

## 2018-07-12 PROCEDURE — 83036 HEMOGLOBIN GLYCOSYLATED A1C: CPT

## 2018-07-12 PROCEDURE — 80061 LIPID PANEL: CPT | Performed by: INTERNAL MEDICINE

## 2018-07-12 PROCEDURE — 36415 COLL VENOUS BLD VENIPUNCTURE: CPT | Performed by: INTERNAL MEDICINE

## 2018-07-12 PROCEDURE — 36415 COLL VENOUS BLD VENIPUNCTURE: CPT

## 2018-07-12 PROCEDURE — 82570 ASSAY OF URINE CREATININE: CPT

## 2018-07-12 PROCEDURE — 80053 COMPREHEN METABOLIC PANEL: CPT

## 2018-07-17 ENCOUNTER — PATIENT OUTREACH (OUTPATIENT)
Dept: CASE MANAGEMENT | Age: 73
End: 2018-07-17

## 2018-07-17 NOTE — PROGRESS NOTES
Attempted to contact pt's wife, no answer left detailed message for pt to call back.    Total time spent with patient including chart review: 2  Time spent with patient this month: 2    Total time spent with communication and chart review this month to date

## 2018-07-31 ENCOUNTER — TELEPHONE (OUTPATIENT)
Dept: FAMILY MEDICINE CLINIC | Facility: CLINIC | Age: 73
End: 2018-07-31

## 2018-07-31 ENCOUNTER — PATIENT OUTREACH (OUTPATIENT)
Dept: CASE MANAGEMENT | Age: 73
End: 2018-07-31

## 2018-07-31 NOTE — TELEPHONE ENCOUNTER
Wife dropped off paperwork for a handicapped placard to be completed by Dr. Pop Rob. Paperwork in triage. Pt's wife aware there may be a $10 charge.

## 2018-08-13 ENCOUNTER — PATIENT OUTREACH (OUTPATIENT)
Dept: CASE MANAGEMENT | Age: 73
End: 2018-08-13

## 2018-08-19 ENCOUNTER — PATIENT MESSAGE (OUTPATIENT)
Dept: FAMILY MEDICINE CLINIC | Facility: CLINIC | Age: 73
End: 2018-08-19

## 2018-08-19 DIAGNOSIS — Z87.898 HISTORY OF URINE COLOR CHANGES: Primary | ICD-10-CM

## 2018-08-20 NOTE — TELEPHONE ENCOUNTER
From: [facility]  To: Moiz Stone MD  Sent: 8/19/2018 5:06 PM CDT  Subject: Non-Urgent Medical Question    Dr. Susan Patel,  My question is about Shawn Reagan.  His urine for the last couple days has been darker than normal . Not blood dark, just a deeper yel

## 2018-08-28 ENCOUNTER — APPOINTMENT (OUTPATIENT)
Dept: LAB | Age: 73
End: 2018-08-28
Attending: FAMILY MEDICINE
Payer: MEDICARE

## 2018-08-28 ENCOUNTER — PATIENT OUTREACH (OUTPATIENT)
Dept: CASE MANAGEMENT | Age: 73
End: 2018-08-28

## 2018-08-28 DIAGNOSIS — Z87.898 HISTORY OF URINE COLOR CHANGES: ICD-10-CM

## 2018-08-28 LAB
BILIRUB UR QL STRIP.AUTO: NEGATIVE
CLARITY UR REFRACT.AUTO: CLEAR
COLOR UR AUTO: YELLOW
GLUCOSE UR STRIP.AUTO-MCNC: NEGATIVE MG/DL
KETONES UR STRIP.AUTO-MCNC: NEGATIVE MG/DL
LEUKOCYTE ESTERASE UR QL STRIP.AUTO: NEGATIVE
NITRITE UR QL STRIP.AUTO: NEGATIVE
PH UR STRIP.AUTO: 6 [PH] (ref 4.5–8)
PROT UR STRIP.AUTO-MCNC: NEGATIVE MG/DL
SP GR UR STRIP.AUTO: 1.01 (ref 1–1.03)
UROBILINOGEN UR STRIP.AUTO-MCNC: <2 MG/DL

## 2018-08-28 PROCEDURE — 81001 URINALYSIS AUTO W/SCOPE: CPT

## 2018-08-29 ENCOUNTER — TELEPHONE (OUTPATIENT)
Dept: FAMILY MEDICINE CLINIC | Facility: CLINIC | Age: 73
End: 2018-08-29

## 2018-08-29 NOTE — TELEPHONE ENCOUNTER
LMOM for pt to call back to answer Medicare Assessment form for appt w/Dr Bonifacio Vázquez on 9/4/18.  (from by daily checklist)

## 2018-09-04 ENCOUNTER — OFFICE VISIT (OUTPATIENT)
Dept: FAMILY MEDICINE CLINIC | Facility: CLINIC | Age: 73
End: 2018-09-04
Payer: MEDICARE

## 2018-09-04 VITALS
SYSTOLIC BLOOD PRESSURE: 102 MMHG | HEART RATE: 80 BPM | HEIGHT: 63.25 IN | BODY MASS INDEX: 24.71 KG/M2 | RESPIRATION RATE: 16 BRPM | WEIGHT: 141.19 LBS | TEMPERATURE: 100 F | DIASTOLIC BLOOD PRESSURE: 74 MMHG

## 2018-09-04 DIAGNOSIS — F22: ICD-10-CM

## 2018-09-04 DIAGNOSIS — I25.10 CORONARY ARTERY DISEASE INVOLVING NATIVE CORONARY ARTERY OF NATIVE HEART WITHOUT ANGINA PECTORIS: ICD-10-CM

## 2018-09-04 DIAGNOSIS — I70.0 THORACIC AORTA ATHEROSCLEROSIS (HCC): ICD-10-CM

## 2018-09-04 DIAGNOSIS — F02.80 DEMENTIA DUE TO PARKINSON'S DISEASE WITHOUT BEHAVIORAL DISTURBANCE (HCC): ICD-10-CM

## 2018-09-04 DIAGNOSIS — I10 ESSENTIAL HYPERTENSION, BENIGN: ICD-10-CM

## 2018-09-04 DIAGNOSIS — E11.29 TYPE 2 DIABETES MELLITUS WITH MICROALBUMINURIA, WITHOUT LONG-TERM CURRENT USE OF INSULIN (HCC): ICD-10-CM

## 2018-09-04 DIAGNOSIS — F19.951 DRUG-INDUCED HALLUCINOSIS (HCC): ICD-10-CM

## 2018-09-04 DIAGNOSIS — G20 DEMENTIA DUE TO PARKINSON'S DISEASE WITHOUT BEHAVIORAL DISTURBANCE (HCC): ICD-10-CM

## 2018-09-04 DIAGNOSIS — Z13.31 DEPRESSION SCREENING: ICD-10-CM

## 2018-09-04 DIAGNOSIS — Z00.00 ENCOUNTER FOR ANNUAL HEALTH EXAMINATION: Primary | ICD-10-CM

## 2018-09-04 DIAGNOSIS — R80.9 TYPE 2 DIABETES MELLITUS WITH MICROALBUMINURIA, WITHOUT LONG-TERM CURRENT USE OF INSULIN (HCC): ICD-10-CM

## 2018-09-04 DIAGNOSIS — G20 PARKINSON'S DISEASE (HCC): ICD-10-CM

## 2018-09-04 PROCEDURE — G0444 DEPRESSION SCREEN ANNUAL: HCPCS | Performed by: FAMILY MEDICINE

## 2018-09-04 PROCEDURE — G0439 PPPS, SUBSEQ VISIT: HCPCS | Performed by: FAMILY MEDICINE

## 2018-09-04 NOTE — PROGRESS NOTES
HPI:   Jori Barrientos is a 67year old male who presents for a Medicare Subsequent Annual Wellness visit (Pt already had Initial Annual Wellness). Preventative Screening  Colonoscopy - due, but given medical history and issues, no indication.     Lj Stein either absent, abnormal, or older than 9days old  If update needed, Please go to \"Cognitive Assessment\" under Medicare Assessment section in Charting, test patient and document. Then, refresh here to see your input here.  If truly abnormal, document mimi of advance directives standard forms performed Face to Face with patient and Family/surrogate (if present), and forms available to patient in AVS         He does have a POA but we do NOT have it on file in [facility].    Advance care planning including the explan angina pectoris    Wt Readings from Last 3 Encounters:  09/04/18 : 141 lb 3.2 oz  04/02/18 : 155 lb  03/19/18 : 150 lb     Last Cholesterol Labs:     Lab Results  Component Value Date   CHOLEST 118 07/12/2018   HDL 68 07/12/2018   LDL 34 07/12/2018   TRIG Fexofenadine HCl (ALLEGRA) 180 MG Oral Tab Take 180 mg by mouth daily. MEDICAL INFORMATION:   He  has a past medical history of Allergic rhinitis due to pollen (6/29/2012); Anemia due to blood loss, chronic (12/8/2012); Asthma (12/2/2012);  Coronary Pass               Visual Acuity  Right Eye Visual Acuity: Uncorrected Right Eye Chart Acuity: 20/60   Left Eye Visual Acuity: Uncorrected Left Eye Chart Acuity:  (sight is distorted)                General Appearance:  Alert, cooperative, no distress, zoë annual health examination  Overall the biggest issues remain the parkinson's and the dementia, but these are progressive and worsening. Seeing specialists. No indication for c-scope or prostate testing. Immun UTD.     Given the impact of these condition level?: Other (walking)  How would you describe your daily physical activity?: Light  How would you describe your current health state?: Good  How do you maintain positive mental well-being?: Visiting Family    This section provided for quick review of jesús Pneumococcal 23 (Pneumovax)  Covered Once after 65 12/03/2012 Please get once after your 65th birthday    Hepatitis B for Moderate/High Risk No vaccine history found Medium/high risk factors:   End-stage renal disease   Hemophiliacs who received Factor VII Annually Data entered on: 10/9/2015   Last Dilated Eye Exam 9/30/2015     No flowsheet data found.

## 2018-09-04 NOTE — PATIENT INSTRUCTIONS
Jama Kim's SCREENING SCHEDULE   Tests on this list are recommended by your physician but may not be covered, or covered at this frequency, by your insurer. Please check with your insurance carrier before scheduling to verify coverage.     Vijay Faith is not a screening covered service except at the Welcome to Medicare Visit    Abdominal aortic aneurysm screening (once between ages 73-68)  No results found for this or any previous visit.  Limited to patients who meet one of the following criteria:   • Me -ADMIN PNEUMOCOCCAL VACCINE    Please get once after your 65th birthday    Hepatitis B for Moderate/High Risk No orders found for this or any previous visit.  Medium/high risk factors:   End-stage renal disease   Hemophiliacs who received Factor VIII or I

## 2018-09-10 ENCOUNTER — PATIENT OUTREACH (OUTPATIENT)
Dept: CASE MANAGEMENT | Age: 73
End: 2018-09-10

## 2018-09-10 DIAGNOSIS — G20 PARKINSON'S DISEASE (HCC): ICD-10-CM

## 2018-09-10 DIAGNOSIS — E11.29 TYPE 2 DIABETES MELLITUS WITH MICROALBUMINURIA, WITHOUT LONG-TERM CURRENT USE OF INSULIN (HCC): ICD-10-CM

## 2018-09-10 DIAGNOSIS — R80.9 TYPE 2 DIABETES MELLITUS WITH MICROALBUMINURIA, WITHOUT LONG-TERM CURRENT USE OF INSULIN (HCC): ICD-10-CM

## 2018-09-10 DIAGNOSIS — I25.10 CORONARY ARTERY DISEASE INVOLVING NATIVE CORONARY ARTERY OF NATIVE HEART WITHOUT ANGINA PECTORIS: ICD-10-CM

## 2018-09-10 DIAGNOSIS — I10 ESSENTIAL HYPERTENSION, BENIGN: ICD-10-CM

## 2018-09-10 DIAGNOSIS — G20 DEMENTIA DUE TO PARKINSON'S DISEASE WITHOUT BEHAVIORAL DISTURBANCE (HCC): ICD-10-CM

## 2018-09-10 DIAGNOSIS — F02.80 DEMENTIA DUE TO PARKINSON'S DISEASE WITHOUT BEHAVIORAL DISTURBANCE (HCC): ICD-10-CM

## 2018-09-10 NOTE — PROGRESS NOTES
9/10/2018  Spoke to Elisabeth Montes wife Nataly for CCM. Updates to patient care team/comments: n/a. Patient reported changes in medications: no changes per pt's wife. Med Adherence  Comment: per Blake Lion pt reports taking all medications as prescribed. and exercise routines. Care Manager Follow Up: in 1 month. Reason For Follow Up: review progress and or barriers towards patients goals.      Care Managers Interventions: Continue to provide encouragement, support, and education for healthy coping

## 2018-09-30 PROCEDURE — 99490 CHRNC CARE MGMT STAFF 1ST 20: CPT

## 2018-10-10 ENCOUNTER — PATIENT OUTREACH (OUTPATIENT)
Dept: CASE MANAGEMENT | Age: 73
End: 2018-10-10

## 2018-10-10 DIAGNOSIS — E11.29 TYPE 2 DIABETES MELLITUS WITH MICROALBUMINURIA, WITHOUT LONG-TERM CURRENT USE OF INSULIN (HCC): ICD-10-CM

## 2018-10-10 DIAGNOSIS — I25.10 CORONARY ARTERY DISEASE INVOLVING NATIVE CORONARY ARTERY OF NATIVE HEART WITHOUT ANGINA PECTORIS: ICD-10-CM

## 2018-10-10 DIAGNOSIS — G20 DEMENTIA DUE TO PARKINSON'S DISEASE WITHOUT BEHAVIORAL DISTURBANCE (HCC): ICD-10-CM

## 2018-10-10 DIAGNOSIS — G20 PARKINSON'S DISEASE (HCC): ICD-10-CM

## 2018-10-10 DIAGNOSIS — I10 ESSENTIAL HYPERTENSION, BENIGN: ICD-10-CM

## 2018-10-10 DIAGNOSIS — R80.9 TYPE 2 DIABETES MELLITUS WITH MICROALBUMINURIA, WITHOUT LONG-TERM CURRENT USE OF INSULIN (HCC): ICD-10-CM

## 2018-10-10 DIAGNOSIS — F02.80 DEMENTIA DUE TO PARKINSON'S DISEASE WITHOUT BEHAVIORAL DISTURBANCE (HCC): ICD-10-CM

## 2018-10-10 NOTE — PROGRESS NOTES
10/10/2018  Spoke to [place] wife Nataly for CCM. Updates to patient care team/comments: n/a. Patient reported changes in medications: no changes per Nataly. Med Adherence  Comment: per  Claudia Marshall pt reports taking all medications as prescribed. concerns.    Monthly Minute Total including today: 22    Physical assessment, complete health history, and need for CCM established by Dallin Valencia MD.

## 2018-10-17 RX ORDER — HYDROCHLOROTHIAZIDE 25 MG/1
TABLET ORAL
Qty: 90 TABLET | Refills: 1 | Status: SHIPPED | OUTPATIENT
Start: 2018-10-17 | End: 2019-02-04

## 2018-10-31 PROCEDURE — 99490 CHRNC CARE MGMT STAFF 1ST 20: CPT

## 2018-11-12 ENCOUNTER — PATIENT OUTREACH (OUTPATIENT)
Dept: CASE MANAGEMENT | Age: 73
End: 2018-11-12

## 2018-11-12 DIAGNOSIS — I10 ESSENTIAL HYPERTENSION, BENIGN: ICD-10-CM

## 2018-11-12 DIAGNOSIS — I25.10 CORONARY ARTERY DISEASE INVOLVING NATIVE CORONARY ARTERY OF NATIVE HEART WITHOUT ANGINA PECTORIS: ICD-10-CM

## 2018-11-12 DIAGNOSIS — G20 DEMENTIA DUE TO PARKINSON'S DISEASE WITHOUT BEHAVIORAL DISTURBANCE (HCC): ICD-10-CM

## 2018-11-12 DIAGNOSIS — G20 PARKINSON'S DISEASE (HCC): ICD-10-CM

## 2018-11-12 DIAGNOSIS — E11.29 TYPE 2 DIABETES MELLITUS WITH MICROALBUMINURIA, WITHOUT LONG-TERM CURRENT USE OF INSULIN (HCC): ICD-10-CM

## 2018-11-12 DIAGNOSIS — F02.80 DEMENTIA DUE TO PARKINSON'S DISEASE WITHOUT BEHAVIORAL DISTURBANCE (HCC): ICD-10-CM

## 2018-11-12 DIAGNOSIS — R80.9 TYPE 2 DIABETES MELLITUS WITH MICROALBUMINURIA, WITHOUT LONG-TERM CURRENT USE OF INSULIN (HCC): ICD-10-CM

## 2018-11-12 NOTE — PROGRESS NOTES
11/12/2018  Spoke to Aniya Ingram for CCM. Updates to patient care team/comments: n/a. Patient reported changes in medications: no changes per Nataly. Med Adherence  Comment: pt reports taking all medications as prescribed.        Health Maint Provided acknowledgment and validation to patient's concerns.    Monthly Minute Total including today: 20    Physical assessment, complete health history, and need for CCM established by Chadwick Haas MD.

## 2018-11-30 PROCEDURE — 99490 CHRNC CARE MGMT STAFF 1ST 20: CPT

## 2018-12-12 ENCOUNTER — PATIENT OUTREACH (OUTPATIENT)
Dept: CASE MANAGEMENT | Age: 73
End: 2018-12-12

## 2018-12-12 NOTE — PROGRESS NOTES
Attempted to contact pt's wife Hortensia Hidbrigitte no answer left detailed message for pt to call back.    Total time spent with patient including chart review: 2  Time spent with patient this month: 2    Total time spent with communication and chart review this month t

## 2018-12-27 ENCOUNTER — PATIENT OUTREACH (OUTPATIENT)
Dept: CASE MANAGEMENT | Age: 73
End: 2018-12-27

## 2018-12-27 DIAGNOSIS — E11.29 TYPE 2 DIABETES MELLITUS WITH MICROALBUMINURIA, WITHOUT LONG-TERM CURRENT USE OF INSULIN (HCC): ICD-10-CM

## 2018-12-27 DIAGNOSIS — F02.80 DEMENTIA IN PARKINSON'S DISEASE (HCC): ICD-10-CM

## 2018-12-27 DIAGNOSIS — I25.10 CORONARY ARTERY DISEASE INVOLVING NATIVE CORONARY ARTERY OF NATIVE HEART WITHOUT ANGINA PECTORIS: ICD-10-CM

## 2018-12-27 DIAGNOSIS — G20 PARKINSON'S DISEASE (HCC): ICD-10-CM

## 2018-12-27 DIAGNOSIS — R80.9 TYPE 2 DIABETES MELLITUS WITH MICROALBUMINURIA, WITHOUT LONG-TERM CURRENT USE OF INSULIN (HCC): ICD-10-CM

## 2018-12-27 DIAGNOSIS — G20 DEMENTIA IN PARKINSON'S DISEASE (HCC): ICD-10-CM

## 2018-12-27 DIAGNOSIS — I10 ESSENTIAL HYPERTENSION, BENIGN: ICD-10-CM

## 2018-12-27 NOTE — PROGRESS NOTES
12/27/2018  Spoke to Belindaoscar Sravanthi wife Nataly for CCM. Updates to patient care team/comments: n/a. Patient reported changes in medications: no changes per Nataly. Med Adherence  Comment: per Mare Centeno pt reports taking all medications as prescribed.        He papers to the office but hasn't received any call backs. Hank Lott states she's just going to go in person to the office after the New Year.     Discussed several resources she can look into for pt and she would like to see the outcome of Medicaid first.    C

## 2018-12-31 PROCEDURE — 99490 CHRNC CARE MGMT STAFF 1ST 20: CPT

## 2019-01-01 ENCOUNTER — TELEPHONE (OUTPATIENT)
Dept: FAMILY MEDICINE CLINIC | Facility: CLINIC | Age: 74
End: 2019-01-01

## 2019-01-01 ENCOUNTER — PATIENT OUTREACH (OUTPATIENT)
Dept: CASE MANAGEMENT | Age: 74
End: 2019-01-01

## 2019-01-01 ENCOUNTER — OFFICE VISIT (OUTPATIENT)
Dept: FAMILY MEDICINE CLINIC | Facility: CLINIC | Age: 74
End: 2019-01-01
Payer: MEDICARE

## 2019-01-01 ENCOUNTER — PATIENT MESSAGE (OUTPATIENT)
Dept: FAMILY MEDICINE CLINIC | Facility: CLINIC | Age: 74
End: 2019-01-01

## 2019-01-01 VITALS
SYSTOLIC BLOOD PRESSURE: 118 MMHG | RESPIRATION RATE: 16 BRPM | DIASTOLIC BLOOD PRESSURE: 64 MMHG | TEMPERATURE: 98 F | HEART RATE: 68 BPM

## 2019-01-01 DIAGNOSIS — F02.80 DEMENTIA IN PARKINSON'S DISEASE (HCC): ICD-10-CM

## 2019-01-01 DIAGNOSIS — G20 PARKINSON'S DISEASE (HCC): ICD-10-CM

## 2019-01-01 DIAGNOSIS — E11.29 TYPE 2 DIABETES MELLITUS WITH MICROALBUMINURIA, WITHOUT LONG-TERM CURRENT USE OF INSULIN (HCC): ICD-10-CM

## 2019-01-01 DIAGNOSIS — I25.10 CORONARY ARTERY DISEASE INVOLVING NATIVE CORONARY ARTERY OF NATIVE HEART WITHOUT ANGINA PECTORIS: ICD-10-CM

## 2019-01-01 DIAGNOSIS — I10 ESSENTIAL HYPERTENSION, BENIGN: ICD-10-CM

## 2019-01-01 DIAGNOSIS — F19.951 DRUG-INDUCED HALLUCINOSIS (HCC): ICD-10-CM

## 2019-01-01 DIAGNOSIS — Z13.31 DEPRESSION SCREENING: ICD-10-CM

## 2019-01-01 DIAGNOSIS — G20 PARKINSON DISEASE (HCC): Primary | ICD-10-CM

## 2019-01-01 DIAGNOSIS — R80.9 TYPE 2 DIABETES MELLITUS WITH MICROALBUMINURIA, WITHOUT LONG-TERM CURRENT USE OF INSULIN (HCC): ICD-10-CM

## 2019-01-01 DIAGNOSIS — G20 DEMENTIA IN PARKINSON'S DISEASE (HCC): ICD-10-CM

## 2019-01-01 DIAGNOSIS — Z00.00 ENCOUNTER FOR ANNUAL HEALTH EXAMINATION: Primary | ICD-10-CM

## 2019-01-01 DIAGNOSIS — I70.0 THORACIC AORTA ATHEROSCLEROSIS (HCC): ICD-10-CM

## 2019-01-01 PROCEDURE — G0008 ADMIN INFLUENZA VIRUS VAC: HCPCS | Performed by: FAMILY MEDICINE

## 2019-01-01 PROCEDURE — G0439 PPPS, SUBSEQ VISIT: HCPCS | Performed by: FAMILY MEDICINE

## 2019-01-01 PROCEDURE — G0444 DEPRESSION SCREEN ANNUAL: HCPCS | Performed by: FAMILY MEDICINE

## 2019-01-01 PROCEDURE — 99490 CHRNC CARE MGMT STAFF 1ST 20: CPT

## 2019-01-01 PROCEDURE — 90662 IIV NO PRSV INCREASED AG IM: CPT | Performed by: FAMILY MEDICINE

## 2019-01-08 DIAGNOSIS — I10 ESSENTIAL HYPERTENSION, BENIGN: ICD-10-CM

## 2019-01-08 RX ORDER — LOSARTAN POTASSIUM 50 MG/1
TABLET ORAL
Qty: 90 TABLET | Refills: 0 | Status: SHIPPED | OUTPATIENT
Start: 2019-01-08 | End: 2019-04-06

## 2019-01-24 ENCOUNTER — PATIENT OUTREACH (OUTPATIENT)
Dept: CASE MANAGEMENT | Age: 74
End: 2019-01-24

## 2019-01-30 ENCOUNTER — PATIENT OUTREACH (OUTPATIENT)
Dept: CASE MANAGEMENT | Age: 74
End: 2019-01-30

## 2019-02-04 DIAGNOSIS — I10 ESSENTIAL HYPERTENSION, BENIGN: Primary | ICD-10-CM

## 2019-02-04 RX ORDER — HYDROCHLOROTHIAZIDE 25 MG/1
TABLET ORAL
Qty: 90 TABLET | Refills: 0 | Status: SHIPPED | OUTPATIENT
Start: 2019-02-04 | End: 2019-04-08

## 2019-02-04 NOTE — TELEPHONE ENCOUNTER
Medication refilled per protocol.      Requested Prescriptions     Signed Prescriptions Disp Refills   • HYDROCHLOROTHIAZIDE 25 MG Oral Tab 90 tablet 0     Sig: TAKE 1 TABLET(25 MG) BY MOUTH DAILY     Authorizing Provider: Gaby Marshall     Ordering User

## 2019-03-07 ENCOUNTER — PATIENT OUTREACH (OUTPATIENT)
Dept: CASE MANAGEMENT | Age: 74
End: 2019-03-07

## 2019-03-07 NOTE — PROGRESS NOTES
Attempted to contact pt's wife no answer left detailed message for pt to call back.    Total time spent with patient including chart review: 2  Time spent with patient this month: 2    Total time spent with communication and chart review this month to date:

## 2019-03-26 ENCOUNTER — APPOINTMENT (OUTPATIENT)
Dept: CT IMAGING | Age: 74
End: 2019-03-26
Attending: EMERGENCY MEDICINE
Payer: MEDICARE

## 2019-03-26 ENCOUNTER — APPOINTMENT (OUTPATIENT)
Dept: GENERAL RADIOLOGY | Age: 74
End: 2019-03-26
Attending: EMERGENCY MEDICINE
Payer: MEDICARE

## 2019-03-26 ENCOUNTER — HOSPITAL ENCOUNTER (EMERGENCY)
Age: 74
Discharge: HOME | End: 2019-03-26
Attending: EMERGENCY MEDICINE
Payer: MEDICARE

## 2019-03-26 VITALS
DIASTOLIC BLOOD PRESSURE: 73 MMHG | SYSTOLIC BLOOD PRESSURE: 136 MMHG | TEMPERATURE: 98 F | BODY MASS INDEX: 26 KG/M2 | RESPIRATION RATE: 20 BRPM | OXYGEN SATURATION: 100 % | HEART RATE: 82 BPM | WEIGHT: 145 LBS

## 2019-03-26 DIAGNOSIS — S80.11XA HEMATOMA OF RIGHT LOWER EXTREMITY, INITIAL ENCOUNTER: ICD-10-CM

## 2019-03-26 DIAGNOSIS — S09.90XA INJURY OF HEAD, INITIAL ENCOUNTER: Primary | ICD-10-CM

## 2019-03-26 DIAGNOSIS — S22.41XA CLOSED FRACTURE OF MULTIPLE RIBS OF RIGHT SIDE, INITIAL ENCOUNTER: ICD-10-CM

## 2019-03-26 DIAGNOSIS — S76.011A STRAIN OF RIGHT HIP, INITIAL ENCOUNTER: ICD-10-CM

## 2019-03-26 DIAGNOSIS — S30.0XXA LUMBAR CONTUSION, INITIAL ENCOUNTER: ICD-10-CM

## 2019-03-26 LAB
ALBUMIN SERPL-MCNC: 3.8 G/DL (ref 3.4–5)
ALBUMIN/GLOB SERPL: 0.9 {RATIO} (ref 1–2)
ALP LIVER SERPL-CCNC: 83 U/L (ref 45–117)
ALT SERPL-CCNC: 8 U/L (ref 16–61)
ANION GAP SERPL CALC-SCNC: 7 MMOL/L (ref 0–18)
AST SERPL-CCNC: 11 U/L (ref 15–37)
BASOPHILS # BLD AUTO: 0.03 X10(3) UL (ref 0–0.2)
BASOPHILS NFR BLD AUTO: 0.3 %
BILIRUB SERPL-MCNC: 0.7 MG/DL (ref 0.1–2)
BILIRUB UR QL STRIP.AUTO: NEGATIVE
BUN BLD-MCNC: 30 MG/DL (ref 7–18)
BUN/CREAT SERPL: 24.2 (ref 10–20)
CALCIUM BLD-MCNC: 8.6 MG/DL (ref 8.5–10.1)
CHLORIDE SERPL-SCNC: 106 MMOL/L (ref 98–107)
CLARITY UR REFRACT.AUTO: CLEAR
CO2 SERPL-SCNC: 27 MMOL/L (ref 21–32)
COLOR UR AUTO: YELLOW
CREAT BLD-MCNC: 1.24 MG/DL (ref 0.7–1.3)
DEPRECATED RDW RBC AUTO: 40.8 FL (ref 35.1–46.3)
EOSINOPHIL # BLD AUTO: 0.22 X10(3) UL (ref 0–0.7)
EOSINOPHIL NFR BLD AUTO: 1.9 %
ERYTHROCYTE [DISTWIDTH] IN BLOOD BY AUTOMATED COUNT: 11.8 % (ref 11–15)
GLOBULIN PLAS-MCNC: 4.4 G/DL (ref 2.8–4.4)
GLUCOSE BLD-MCNC: 158 MG/DL (ref 70–99)
GLUCOSE UR STRIP.AUTO-MCNC: NEGATIVE MG/DL
HCT VFR BLD AUTO: 39.5 % (ref 39–53)
HGB BLD-MCNC: 13.1 G/DL (ref 13–17.5)
IMM GRANULOCYTES # BLD AUTO: 0.07 X10(3) UL (ref 0–1)
IMM GRANULOCYTES NFR BLD: 0.6 %
KETONES UR STRIP.AUTO-MCNC: NEGATIVE MG/DL
LEUKOCYTE ESTERASE UR QL STRIP.AUTO: NEGATIVE
LYMPHOCYTES # BLD AUTO: 1.55 X10(3) UL (ref 1–4)
LYMPHOCYTES NFR BLD AUTO: 13.3 %
M PROTEIN MFR SERPL ELPH: 8.2 G/DL (ref 6.4–8.2)
MCH RBC QN AUTO: 31.6 PG (ref 26–34)
MCHC RBC AUTO-ENTMCNC: 33.2 G/DL (ref 31–37)
MCV RBC AUTO: 95.4 FL (ref 80–100)
MONOCYTES # BLD AUTO: 1.01 X10(3) UL (ref 0.1–1)
MONOCYTES NFR BLD AUTO: 8.7 %
NEUTROPHILS # BLD AUTO: 8.76 X10 (3) UL (ref 1.5–7.7)
NEUTROPHILS # BLD AUTO: 8.76 X10(3) UL (ref 1.5–7.7)
NEUTROPHILS NFR BLD AUTO: 75.2 %
NITRITE UR QL STRIP.AUTO: NEGATIVE
OSMOLALITY SERPL CALC.SUM OF ELEC: 299 MOSM/KG (ref 275–295)
PH UR STRIP.AUTO: 7 [PH] (ref 4.5–8)
PLATELET # BLD AUTO: 189 10(3)UL (ref 150–450)
POTASSIUM SERPL-SCNC: 4.3 MMOL/L (ref 3.5–5.1)
PROT UR STRIP.AUTO-MCNC: NEGATIVE MG/DL
RBC # BLD AUTO: 4.14 X10(6)UL (ref 3.8–5.8)
RBC UR QL AUTO: NEGATIVE
SODIUM SERPL-SCNC: 140 MMOL/L (ref 136–145)
SP GR UR STRIP.AUTO: 1.01 (ref 1–1.03)
UROBILINOGEN UR STRIP.AUTO-MCNC: 0.2 MG/DL
WBC # BLD AUTO: 11.6 X10(3) UL (ref 4–11)

## 2019-03-26 PROCEDURE — 99284 EMERGENCY DEPT VISIT MOD MDM: CPT

## 2019-03-26 PROCEDURE — 70450 CT HEAD/BRAIN W/O DYE: CPT | Performed by: EMERGENCY MEDICINE

## 2019-03-26 PROCEDURE — 96361 HYDRATE IV INFUSION ADD-ON: CPT

## 2019-03-26 PROCEDURE — 96360 HYDRATION IV INFUSION INIT: CPT

## 2019-03-26 PROCEDURE — 81003 URINALYSIS AUTO W/O SCOPE: CPT | Performed by: EMERGENCY MEDICINE

## 2019-03-26 PROCEDURE — 85025 COMPLETE CBC W/AUTO DIFF WBC: CPT | Performed by: EMERGENCY MEDICINE

## 2019-03-26 PROCEDURE — 73502 X-RAY EXAM HIP UNI 2-3 VIEWS: CPT | Performed by: EMERGENCY MEDICINE

## 2019-03-26 PROCEDURE — 74177 CT ABD & PELVIS W/CONTRAST: CPT | Performed by: EMERGENCY MEDICINE

## 2019-03-26 PROCEDURE — 71045 X-RAY EXAM CHEST 1 VIEW: CPT | Performed by: EMERGENCY MEDICINE

## 2019-03-26 PROCEDURE — 80053 COMPREHEN METABOLIC PANEL: CPT | Performed by: EMERGENCY MEDICINE

## 2019-03-26 RX ORDER — SODIUM CHLORIDE 9 MG/ML
125 INJECTION, SOLUTION INTRAVENOUS CONTINUOUS
Status: DISCONTINUED | OUTPATIENT
Start: 2019-03-26 | End: 2019-03-26

## 2019-03-26 RX ORDER — HYDROCODONE BITARTRATE AND ACETAMINOPHEN 5; 325 MG/1; MG/1
1 TABLET ORAL EVERY 8 HOURS PRN
Qty: 10 TABLET | Refills: 0 | Status: SHIPPED | OUTPATIENT
Start: 2019-03-26 | End: 2019-04-02

## 2019-03-26 RX ORDER — POLYETHYLENE GLYCOL 3350 17 G/17G
17 POWDER, FOR SOLUTION ORAL DAILY PRN
Qty: 30 EACH | Refills: 0 | Status: SHIPPED | OUTPATIENT
Start: 2019-03-26 | End: 2019-04-02

## 2019-03-26 NOTE — ED PROVIDER NOTES
The patient's care was endorsed to me by Dr. Gage Barahona. CT scan reveals nondisplaced fractures of the left eighth and ninth ribs. Abdominal exam is nontender.   Test results and treatment plan were discussed with the patient and his wife prior to

## 2019-03-26 NOTE — ED INITIAL ASSESSMENT (HPI)
Spouse reports pt fell in bathroom on thrus and last night at 2 am, lost his balance , noted bruise to right flank area and right hip, states he did his hit his head, no vomiting

## 2019-03-26 NOTE — ED PROVIDER NOTES
Patient Seen in: [facility] [place]    History   Patient presents with:  Fall (musculoskeletal, neurologic)    Stated Complaint: fall - hip pain     HPI    This is a 43-year-old male who has a history of Parkinson's disease.   Is go HISTORY  7/21/16    Cysto  Dr. Ellen Lujan   • PERCUTANEOUS  ANGIOPLASTY  (PTCA)- PBP ONLY  5/4/2012    stenting of L circumflex coronary artery   • TONSILLECTOMY  1950           Social History    Tobacco Use      Smoking status: Former Smoker        Years: rebound. There is no guarding. EXT: There is good pulses bilaterally. There is no calf tenderness. There is no rash noted. There is no edema    NEURO: Alert and oriented x3.   Muscle strength and sensory exam is grossly normal.  And the patient is ne comprehensive shows mild dehydration, glucose is slightly high 158 CBC was essentially within normal limits.     Xr Chest Ap/pa (1 View) (cpt=71045)    Result Date: 3/26/2019  PROCEDURE:  XR CHEST AP/PA (1 VIEW) (CPT=71045)  TECHNIQUE:  AP chest radiograph microvascular ischemic changes. No acute intracranial hemorrhage is seen. SINUSES:           Moderate mucosal thickening noted of the paranasal sinuses. MASTOIDS:          No sign of acute inflammation.  SKULL:             No evidence for fracture or osseo visit.     Follow-up:  MD Bao Braga N Kushal [place] [id number] [place] [phone]              Medications Prescribed:  Current Discharge Medication List    START taking these medications    HYDROcodone-acetaminophen 5-325 MG Oral Tab

## 2019-03-27 ENCOUNTER — PATIENT OUTREACH (OUTPATIENT)
Dept: CASE MANAGEMENT | Age: 74
End: 2019-03-27

## 2019-03-27 DIAGNOSIS — G20 PARKINSON'S DISEASE (HCC): ICD-10-CM

## 2019-03-27 DIAGNOSIS — I10 ESSENTIAL HYPERTENSION, BENIGN: ICD-10-CM

## 2019-03-27 DIAGNOSIS — R80.9 TYPE 2 DIABETES MELLITUS WITH MICROALBUMINURIA, WITHOUT LONG-TERM CURRENT USE OF INSULIN (HCC): ICD-10-CM

## 2019-03-27 DIAGNOSIS — F02.80 DEMENTIA IN PARKINSON'S DISEASE (HCC): ICD-10-CM

## 2019-03-27 DIAGNOSIS — I25.10 CORONARY ARTERY DISEASE INVOLVING NATIVE CORONARY ARTERY OF NATIVE HEART WITHOUT ANGINA PECTORIS: ICD-10-CM

## 2019-03-27 DIAGNOSIS — E11.29 TYPE 2 DIABETES MELLITUS WITH MICROALBUMINURIA, WITHOUT LONG-TERM CURRENT USE OF INSULIN (HCC): ICD-10-CM

## 2019-03-27 DIAGNOSIS — G20 DEMENTIA IN PARKINSON'S DISEASE (HCC): ICD-10-CM

## 2019-03-27 RX ORDER — PIMAVANSERIN TARTRATE 34 MG/1
CAPSULE ORAL
COMMUNITY
Start: 2019-03-18

## 2019-03-27 NOTE — PROGRESS NOTES
3/27/2019  Spoke to Mario Alberto Kaufman wife Nataly for CCM. Updates to patient care team/comments: n/a. Patient reported changes in medications: no changes. Med Adherence  Comment: pt reports taking all medications as prescribed. Health Maintenance:    Bonifacio Daigle it contribute to pt's imbalance. States Tylenol has been helping pt with his pain. Diet: Nataly states pt is eating well, appetite good. Discussed extra help and if needed I have several resources but [place] Nolan voiced she thinks things are ok for now. recreation/update. Provided acknowledgment and validation to patient's concerns.    Monthly Minute Total including today: 37    Physical assessment, complete health history, and need for CCM established by Violeta Chávez MD.

## 2019-03-31 PROCEDURE — 99490 CHRNC CARE MGMT STAFF 1ST 20: CPT

## 2019-04-06 DIAGNOSIS — I10 ESSENTIAL HYPERTENSION, BENIGN: ICD-10-CM

## 2019-04-06 RX ORDER — LOSARTAN POTASSIUM 50 MG/1
TABLET ORAL
Qty: 90 TABLET | Refills: 0 | Status: SHIPPED | OUTPATIENT
Start: 2019-04-06 | End: 2019-04-08

## 2019-04-06 NOTE — TELEPHONE ENCOUNTER
Medication refilled per protocol.      Requested Prescriptions     Signed Prescriptions Disp Refills   • LOSARTAN POTASSIUM 50 MG Oral Tab 90 tablet 0     Sig: TAKE 1 TABLET(50 MG) BY MOUTH DAILY     Authorizing Provider: Jaycob Overall     Ordering User:

## 2019-04-10 ENCOUNTER — OFFICE VISIT (OUTPATIENT)
Dept: FAMILY MEDICINE CLINIC | Facility: CLINIC | Age: 74
End: 2019-04-10
Payer: MEDICARE

## 2019-04-10 VITALS
RESPIRATION RATE: 16 BRPM | DIASTOLIC BLOOD PRESSURE: 74 MMHG | BODY MASS INDEX: 26 KG/M2 | WEIGHT: 147.81 LBS | TEMPERATURE: 98 F | HEART RATE: 76 BPM | SYSTOLIC BLOOD PRESSURE: 128 MMHG

## 2019-04-10 DIAGNOSIS — S22.42XA CLOSED FRACTURE OF MULTIPLE RIBS OF LEFT SIDE, INITIAL ENCOUNTER: Primary | ICD-10-CM

## 2019-04-10 PROCEDURE — 99213 OFFICE O/P EST LOW 20 MIN: CPT | Performed by: FAMILY MEDICINE

## 2019-04-10 NOTE — PROGRESS NOTES
Patient presents with:  ER F/U: Fall 3/21 and 3/22. Went to ER 3/26. L rib fx. Bruise R hip     HPI:   Monica Kelley is a 75year old male who presents to the office for f/u after fall. Occurred 3/21 and 3/22. First in the bathroom - just lost balance.

## 2019-04-17 ENCOUNTER — PATIENT OUTREACH (OUTPATIENT)
Dept: CASE MANAGEMENT | Age: 74
End: 2019-04-17

## 2019-04-17 DIAGNOSIS — G20 DEMENTIA IN PARKINSON'S DISEASE (HCC): ICD-10-CM

## 2019-04-17 DIAGNOSIS — I10 ESSENTIAL HYPERTENSION, BENIGN: ICD-10-CM

## 2019-04-17 DIAGNOSIS — R80.9 TYPE 2 DIABETES MELLITUS WITH MICROALBUMINURIA, WITHOUT LONG-TERM CURRENT USE OF INSULIN (HCC): ICD-10-CM

## 2019-04-17 DIAGNOSIS — G20 PARKINSON'S DISEASE (HCC): ICD-10-CM

## 2019-04-17 DIAGNOSIS — F02.80 DEMENTIA IN PARKINSON'S DISEASE (HCC): ICD-10-CM

## 2019-04-17 DIAGNOSIS — E11.29 TYPE 2 DIABETES MELLITUS WITH MICROALBUMINURIA, WITHOUT LONG-TERM CURRENT USE OF INSULIN (HCC): ICD-10-CM

## 2019-04-17 DIAGNOSIS — I25.10 CORONARY ARTERY DISEASE INVOLVING NATIVE CORONARY ARTERY OF NATIVE HEART WITHOUT ANGINA PECTORIS: ICD-10-CM

## 2019-04-17 NOTE — PROGRESS NOTES
4/17/2019  Spoke to Shaina boo Nataly for CCM. Updates to patient care team/comments: n/a. Patient reported changes in medications: no changes. Med Adherence  Comment: pt reports taking all medications as prescribed. Health Maintenance:    Antonella Akins Nataly. Care Manager Follow Up: in 1 month. Reason For Follow Up: review progress and or barriers towards patients goals. Care Managers Interventions: Continue to provide encouragement, support, and education for healthy coping and dx.   Yomaira Robbins

## 2019-04-30 PROCEDURE — 99490 CHRNC CARE MGMT STAFF 1ST 20: CPT

## 2019-05-21 ENCOUNTER — PATIENT OUTREACH (OUTPATIENT)
Dept: CASE MANAGEMENT | Age: 74
End: 2019-05-21

## 2019-05-21 DIAGNOSIS — I25.10 CORONARY ARTERY DISEASE INVOLVING NATIVE CORONARY ARTERY OF NATIVE HEART WITHOUT ANGINA PECTORIS: ICD-10-CM

## 2019-05-21 DIAGNOSIS — G20 DEMENTIA IN PARKINSON'S DISEASE (HCC): ICD-10-CM

## 2019-05-21 DIAGNOSIS — I10 ESSENTIAL HYPERTENSION, BENIGN: ICD-10-CM

## 2019-05-21 DIAGNOSIS — R80.9 TYPE 2 DIABETES MELLITUS WITH MICROALBUMINURIA, WITHOUT LONG-TERM CURRENT USE OF INSULIN (HCC): ICD-10-CM

## 2019-05-21 DIAGNOSIS — G20 PARKINSON'S DISEASE (HCC): ICD-10-CM

## 2019-05-21 DIAGNOSIS — F02.80 DEMENTIA IN PARKINSON'S DISEASE (HCC): ICD-10-CM

## 2019-05-21 DIAGNOSIS — E11.29 TYPE 2 DIABETES MELLITUS WITH MICROALBUMINURIA, WITHOUT LONG-TERM CURRENT USE OF INSULIN (HCC): ICD-10-CM

## 2019-05-22 NOTE — PROGRESS NOTES
5/21/2019  Spoke to [place] for CCM. Updates to patient care team/comments: n/a. Patient reported changes in medications: no changes. Med Adherence  Comment: pt reports taking all medications as prescribed.        Health Maintenance: discussed and re he’ll walk around the condo, up and down the hallways or around the kitchen. States her walls look like she has 1year old kids with all the hand prints on the wall from him holding onto the walls as he's walking.   Claudia Marshall say's she doesn't say anything ab

## 2019-05-23 ENCOUNTER — TELEPHONE (OUTPATIENT)
Dept: FAMILY MEDICINE CLINIC | Facility: CLINIC | Age: 74
End: 2019-05-23

## 2019-05-23 NOTE — TELEPHONE ENCOUNTER
Wife[facility]y states [facility]  has suggested that pt may benefit from at home PT/OT. Pt has Parkinson's and has had a recent sinus infection which interferes with his balance/mobility. Pt has fallen several times recently.  Denies hitting his he

## 2019-05-23 NOTE — TELEPHONE ENCOUNTER
Triage: Good Morning :)     Pt's wife would like to know if Dr. Charly Busby can order [address] for pt. Notified pt I would contact Dr. Lundberg [facility] office and a nurse will call her back for further assessment. Understanding verbalized by Ihor Schlatter.       Ple

## 2019-05-31 PROCEDURE — 99490 CHRNC CARE MGMT STAFF 1ST 20: CPT

## 2019-06-24 ENCOUNTER — TELEPHONE (OUTPATIENT)
Dept: FAMILY MEDICINE CLINIC | Facility: CLINIC | Age: 74
End: 2019-06-24

## 2019-06-24 DIAGNOSIS — E11.29 TYPE 2 DIABETES MELLITUS WITH MICROALBUMINURIA, WITHOUT LONG-TERM CURRENT USE OF INSULIN (HCC): Primary | ICD-10-CM

## 2019-06-24 DIAGNOSIS — R80.9 TYPE 2 DIABETES MELLITUS WITH MICROALBUMINURIA, WITHOUT LONG-TERM CURRENT USE OF INSULIN (HCC): Primary | ICD-10-CM

## 2019-06-25 ENCOUNTER — PATIENT OUTREACH (OUTPATIENT)
Dept: CASE MANAGEMENT | Age: 74
End: 2019-06-25

## 2019-07-02 DIAGNOSIS — I10 ESSENTIAL HYPERTENSION, BENIGN: ICD-10-CM

## 2019-07-02 RX ORDER — LOSARTAN POTASSIUM 50 MG/1
TABLET ORAL
Qty: 90 TABLET | Refills: 0 | Status: SHIPPED | OUTPATIENT
Start: 2019-07-02 | End: 2019-01-01 | Stop reason: ALTCHOICE

## 2019-07-02 NOTE — TELEPHONE ENCOUNTER
Medication refilled per protocol.      Requested Prescriptions     Signed Prescriptions Disp Refills   • LOSARTAN POTASSIUM 50 MG Oral Tab 90 tablet 0     Sig: TAKE 1 TABLET(50 MG) BY MOUTH DAILY     Authorizing Provider: Stephon Gorman     Ordering User:

## 2019-07-12 ENCOUNTER — PATIENT OUTREACH (OUTPATIENT)
Dept: CASE MANAGEMENT | Age: 74
End: 2019-07-12

## 2019-07-12 DIAGNOSIS — G20 PARKINSON'S DISEASE (HCC): ICD-10-CM

## 2019-07-12 DIAGNOSIS — E11.29 TYPE 2 DIABETES MELLITUS WITH MICROALBUMINURIA, WITHOUT LONG-TERM CURRENT USE OF INSULIN (HCC): ICD-10-CM

## 2019-07-12 DIAGNOSIS — R80.9 TYPE 2 DIABETES MELLITUS WITH MICROALBUMINURIA, WITHOUT LONG-TERM CURRENT USE OF INSULIN (HCC): ICD-10-CM

## 2019-07-12 DIAGNOSIS — I25.10 CORONARY ARTERY DISEASE INVOLVING NATIVE CORONARY ARTERY OF NATIVE HEART WITHOUT ANGINA PECTORIS: ICD-10-CM

## 2019-07-12 DIAGNOSIS — I10 ESSENTIAL HYPERTENSION, BENIGN: ICD-10-CM

## 2019-07-12 DIAGNOSIS — F02.80 DEMENTIA IN PARKINSON'S DISEASE (HCC): ICD-10-CM

## 2019-07-12 DIAGNOSIS — G20 DEMENTIA IN PARKINSON'S DISEASE (HCC): ICD-10-CM

## 2019-07-12 DIAGNOSIS — I00 RHEUMATIC FEVER: ICD-10-CM

## 2019-07-12 RX ORDER — METHYLPHENIDATE HYDROCHLORIDE 5 MG/1
TABLET ORAL
Refills: 0 | COMMUNITY
Start: 2019-06-18 | End: 2019-01-01

## 2019-07-12 NOTE — PROGRESS NOTES
7/12/2019  Spoke to Alfredo Thurston wife for CCM. Updates to patient care team/comments: n/a. Patient reported changes in medications: no changes. Med Adherence  Comment: pt reports taking all medications as prescribed. Health Maintenance:    Your Appo much better which helps when she has to assist pt. Diabetes: voices compliance.   Will ask for lab orders at scheduled AWV in Sept.    HGBA1C:    Lab Results   Component Value Date    A1C 6.8 (H) 07/12/2018    A1C 6.6 (A) 09/01/2017    A1C 6.3 (H) 08/26

## 2019-07-22 ENCOUNTER — PATIENT MESSAGE (OUTPATIENT)
Dept: FAMILY MEDICINE CLINIC | Facility: CLINIC | Age: 74
End: 2019-07-22

## 2019-07-31 PROCEDURE — 99490 CHRNC CARE MGMT STAFF 1ST 20: CPT

## 2019-08-06 ENCOUNTER — PATIENT MESSAGE (OUTPATIENT)
Dept: FAMILY MEDICINE CLINIC | Facility: CLINIC | Age: 74
End: 2019-08-06

## 2019-08-06 DIAGNOSIS — R82.998 DARK URINE: ICD-10-CM

## 2019-08-06 DIAGNOSIS — R30.0 DYSURIA: Primary | ICD-10-CM

## 2019-08-06 NOTE — TELEPHONE ENCOUNTER
From: Nabila Kim  To: Mattie Mai MD  Sent: 8/6/2019 10:53 AM CDT  Subject: Other    Dr. Sumeet Larkin,  Can you please order a Urinalysis for Anyi Albarado. For the past couple of days his urine has been very dark and he says it burns a little bit.  I ask this

## 2019-08-08 ENCOUNTER — APPOINTMENT (OUTPATIENT)
Dept: LAB | Age: 74
End: 2019-08-08
Attending: FAMILY MEDICINE
Payer: MEDICARE

## 2019-08-08 DIAGNOSIS — R82.998 DARK URINE: ICD-10-CM

## 2019-08-08 DIAGNOSIS — R30.0 DYSURIA: ICD-10-CM

## 2019-08-08 LAB
BILIRUB UR QL STRIP.AUTO: NEGATIVE
CLARITY UR REFRACT.AUTO: CLEAR
COLOR UR AUTO: YELLOW
GLUCOSE UR STRIP.AUTO-MCNC: NEGATIVE MG/DL
HYALINE CASTS #/AREA URNS AUTO: PRESENT /LPF
LEUKOCYTE ESTERASE UR QL STRIP.AUTO: NEGATIVE
NITRITE UR QL STRIP.AUTO: NEGATIVE
PH UR STRIP.AUTO: 5 [PH] (ref 4.5–8)
PROT UR STRIP.AUTO-MCNC: NEGATIVE MG/DL
SP GR UR STRIP.AUTO: 1.02 (ref 1–1.03)
UROBILINOGEN UR STRIP.AUTO-MCNC: <2 MG/DL

## 2019-08-08 PROCEDURE — 81001 URINALYSIS AUTO W/SCOPE: CPT

## 2019-08-19 ENCOUNTER — PATIENT OUTREACH (OUTPATIENT)
Dept: CASE MANAGEMENT | Age: 74
End: 2019-08-19

## 2019-09-01 NOTE — TELEPHONE ENCOUNTER
From: Manav Bynum  To: Olga Lidia Madden MD  Sent: 7/22/2019 2:07 PM CDT  Subject: Other    Dr. Kuldeep Schmitz we had to take Shameka Means by ambulance to the emergency room as he was being totally unresponsIve and would not open his eyes.  I am attempting
Walking

## 2019-09-11 NOTE — PROGRESS NOTES
HPI:   Clement Robbins is a 75year old male who presents for a Medicare Subsequent Annual Wellness visit (Pt already had Initial Annual Wellness). Preventative Screening  Colonoscopy - at 75 with his health, no indication. Prostate - sees urology. Alex Hanson has some abnormal functions as listed below:  He has Dressing and/or Bathing issues based on screening of functional status.    Difficulty dressing or bathing?: Yes  Bathing or Showering: Cannot do without help  Dressing: Need some help depressed, or hopeless: Not at all  3. Trouble falling or staying asleep, or sleeping too much: More than half the days  4. Feeling tired or having little energy: Nearly every day  5. Poor appetite or overeating: Not at all  6.  Feeling bad about yourself - Care Team: Patient Care Team:  Steph Prater MD as PCP - General (Family Medicine)  Steph Prater MD as PCP - Eleanor Asencio MD (Cardiovascular Diseases)  Swapnil Waller ([address] [place] [address])  Colby Woo (NEUROLOGY)  Julio César Pink (PSYCHIATRIST) Bruce Barrios MD:  METOPROLOL SUCCINATE ER 25 MG Oral Tablet 24 Hr TAKE 1 TABLET BY MOUTH ONCE DAILY   Methylphenidate HCl 5 MG Oral Tab TK 1 T PO D   PRAVASTATIN SODIUM 40 MG Oral Tab TAKE 1 TABLET BY MOUTH NIGHTLY   hydrochlorothiazide 25 MG Oral Tab TAKE 1 use included cigarettes. He quit after 20.00 years of use. He has never used smokeless tobacco. He reports that he does not drink alcohol or use drugs.      REVIEW OF SYSTEMS:     Constitutional: negative  Eyes: negative  Ears, nose, mouth, throat, and face 10/16/2018   • Influenza 12/03/2012   • Pneumococcal (Prevnar 13) 10/26/2015   • Pneumovax 23 12/03/2012   • Zoster Vaccine Live (Zostavax) 07/30/2014   Pended Date(s) Pended   • FLU VACC High Dose 65 YRS & Older PRSV Free (06626) 09/11/2019        ASSESSM 09/01/2017 6.6 (A)     HgbA1C (%)   Date Value   07/12/2018 6.8 (H)       No flowsheet data found.     Fasting Blood Sugar (FSB)Annually Glucose (mg/dL)   Date Value   03/26/2019 158 (H)     GLUCOSE (mg/dL)   Date Value   07/09/2014 132 (H)   03/01/2011 1 with metal- TD and TDaP Not covered by Medicare Part B) No vaccine history found This may be covered with your prescription benefits, but Medicare does not cover unless Medically needed    Zoster   Not covered by Medicare Part B No vaccine history found Th

## 2019-09-11 NOTE — PATIENT INSTRUCTIONS
Maxx Kim's SCREENING SCHEDULE   Tests on this list are recommended by your physician but may not be covered, or covered at this frequency, by your insurer. Please check with your insurance carrier before scheduling to verify coverage.     Movli covered service except at the Welcome to Medicare Visit    Abdominal aortic aneurysm screening (once between ages 73-68)  No results found for this or any previous visit.  Limited to patients who meet one of the following criteria:   • Men who are 65-75 yea performed in visit on 12/03/12   • PNEUMOCOCCAL IMMUNIZATION   • ADMIN PNEUMOCOCCAL VACCINE    Please get once after your 65th birthday    Hepatitis B for Moderate/High Risk No orders found for this or any previous visit.  Medium/high risk factors:   End-st

## 2019-09-24 NOTE — PROGRESS NOTES
9/24/2019  Spoke to [place] wife for CCM. Updates to patient care team/comments: n/a. Patient reported changes in medications: no changes. Med Adherence  Comment: pt reports taking all medications as prescribed. Health Maintenance:    Your Appo Discussed good fat's and foods to avoid but don't overdo it, as all fats are high in calories. Foods containing the following can work against your goal of a heart-healthy diet. Saturated fats.  Avoid high-fat dairy products and animal proteins such as bu

## 2019-10-09 NOTE — TELEPHONE ENCOUNTER
From: Екатерина Mccord  To: Lilia Bar MD  Sent: 10/9/2019 9:33 AM CDT  Subject: Other    Dr. Radha Greenberg,  I am inquiring about getting some in-house physical therapy for Jalen Ziegler.  He is not able to go to a facility and I really feel he could use this and I

## 2019-10-09 NOTE — PROGRESS NOTES
Ca we set up home health for this patient. Increased spasticity, parkinson's. He is not able to drive, need help in ambulating and getting around.   When I saw him he was in a wheelchair

## 2019-10-14 NOTE — PROGRESS NOTES
Any update on if he is qualified?   They requested this a week ago, I think he sounds like a good candidate

## 2019-10-15 NOTE — TELEPHONE ENCOUNTER
[facility]  Fax number: [phone]    Evaluate and treat  8 visits    Parkinsons with spasticity

## 2019-10-17 NOTE — TELEPHONE ENCOUNTER
Pt was seen by residential for a PT eval and they are recommending a OP and Speech therapy eval. She needs a verbal ok

## 2019-10-17 NOTE — TELEPHONE ENCOUNTER
PT from [facility] is with patient for a eval for start up care and she is reporting a drug interaction.  The medication is rasagrline 1 mg it is reacting with ritalin 5 mg both prescribed by neurologist and she would need permission from our office to yamilex

## 2019-10-25 NOTE — TELEPHONE ENCOUNTER
Zoran Vega at residential home called to give a plan of care update, patient will continue occupational therapy 1 time a week for 1 week then 2 times a week for two weeks

## 2019-10-25 NOTE — TELEPHONE ENCOUNTER
Aleida Henriquez at [facility] notified that Dr Galina Allen is ok with speech eval next week.

## 2019-10-29 NOTE — TELEPHONE ENCOUNTER
[facility] Physical Therapist called to notify Dr. Guzman Segovia patient had a fall last Thursday or Friday (spouse was not sure on the exact date) patient has a chin abrasion no trauma.  Please contact Chuyita if any questions

## 2019-11-07 NOTE — TELEPHONE ENCOUNTER
Glenda De LaC ruz at residential home [facility] called to get a verbal from  [facility] to extend occupational therapy 1 time a week for 2 weeks

## 2019-11-08 NOTE — TELEPHONE ENCOUNTER
Notified Chuyita with [facility] that Dr Ryan Aldana approves of the plan.

## 2019-11-08 NOTE — TELEPHONE ENCOUNTER
Chuyita with [facility] calling to state that PT visit for today is being missed as she has a scheduling conflict so she will be extending PT once a week for 2 weeks.  Please call

## 2019-11-15 NOTE — TELEPHONE ENCOUNTER
Zoran Vega is calling to get a verbal from Dr. Ari Swann or his nurse to discharge patient for home health. Zoran Vega said he has reached his potential and his wife is available to assist him as needed.

## 2020-01-01 ENCOUNTER — TELEPHONE (OUTPATIENT)
Dept: FAMILY MEDICINE CLINIC | Facility: CLINIC | Age: 75
End: 2020-01-01

## 2020-01-01 ENCOUNTER — PATIENT OUTREACH (OUTPATIENT)
Dept: CASE MANAGEMENT | Age: 75
End: 2020-01-01

## 2020-01-01 ENCOUNTER — PATIENT MESSAGE (OUTPATIENT)
Dept: FAMILY MEDICINE CLINIC | Facility: CLINIC | Age: 75
End: 2020-01-01

## 2020-01-01 ENCOUNTER — OFFICE VISIT (OUTPATIENT)
Dept: FAMILY MEDICINE CLINIC | Facility: CLINIC | Age: 75
End: 2020-01-01
Payer: MEDICARE

## 2020-01-01 VITALS — DIASTOLIC BLOOD PRESSURE: 80 MMHG | RESPIRATION RATE: 16 BRPM | HEART RATE: 72 BPM | SYSTOLIC BLOOD PRESSURE: 120 MMHG

## 2020-01-01 DIAGNOSIS — J45.20 MILD INTERMITTENT ASTHMA, UNSPECIFIED WHETHER COMPLICATED: Primary | ICD-10-CM

## 2020-01-01 DIAGNOSIS — R80.9 TYPE 2 DIABETES MELLITUS WITH MICROALBUMINURIA, WITHOUT LONG-TERM CURRENT USE OF INSULIN (HCC): ICD-10-CM

## 2020-01-01 DIAGNOSIS — I10 ESSENTIAL HYPERTENSION, BENIGN: ICD-10-CM

## 2020-01-01 DIAGNOSIS — F19.951 DRUG-INDUCED HALLUCINOSIS (HCC): ICD-10-CM

## 2020-01-01 DIAGNOSIS — F06.0: ICD-10-CM

## 2020-01-01 DIAGNOSIS — F02.80 DEMENTIA IN PARKINSON'S DISEASE (HCC): ICD-10-CM

## 2020-01-01 DIAGNOSIS — E55.9 VITAMIN D DEFICIENCY: Primary | ICD-10-CM

## 2020-01-01 DIAGNOSIS — F22: ICD-10-CM

## 2020-01-01 DIAGNOSIS — G20 PARKINSON'S DISEASE (HCC): ICD-10-CM

## 2020-01-01 DIAGNOSIS — E11.29 TYPE 2 DIABETES MELLITUS WITH MICROALBUMINURIA, WITHOUT LONG-TERM CURRENT USE OF INSULIN (HCC): ICD-10-CM

## 2020-01-01 DIAGNOSIS — I25.10 CORONARY ARTERY DISEASE INVOLVING NATIVE CORONARY ARTERY OF NATIVE HEART WITHOUT ANGINA PECTORIS: ICD-10-CM

## 2020-01-01 DIAGNOSIS — K11.7 SIALORRHEA: ICD-10-CM

## 2020-01-01 DIAGNOSIS — G20 PARKINSON'S DISEASE (HCC): Primary | ICD-10-CM

## 2020-01-01 DIAGNOSIS — G20 DEMENTIA IN PARKINSON'S DISEASE (HCC): ICD-10-CM

## 2020-01-01 DIAGNOSIS — K92.2 GASTROINTESTINAL HEMORRHAGE, UNSPECIFIED GASTROINTESTINAL HEMORRHAGE TYPE: ICD-10-CM

## 2020-01-01 DIAGNOSIS — R32 URINARY INCONTINENCE, UNSPECIFIED TYPE: Primary | ICD-10-CM

## 2020-01-01 DIAGNOSIS — I70.0 THORACIC AORTA ATHEROSCLEROSIS (HCC): ICD-10-CM

## 2020-01-01 DIAGNOSIS — L89.312 PRESSURE INJURY OF RIGHT BUTTOCK, STAGE 2 (HCC): Primary | ICD-10-CM

## 2020-01-01 DIAGNOSIS — I70.0 THORACIC AORTA ATHEROSCLEROSIS (HCC): Primary | ICD-10-CM

## 2020-01-01 LAB
AMB EXT CHOLESTEROL, TOTAL: 118 MG/DL
AMB EXT CREATININE: 1.24 MG/DL
AMB EXT HDL CHOLESTEROL: 61 MG/DL
AMB EXT HEMATOCRIT: 46.7
AMB EXT HEMOGLOBIN: 16.1
AMB EXT HGBA1C: 7.9 %
AMB EXT LDL CHOLESTEROL, DIRECT: 44 MG/DL
AMB EXT MCV: 93.6
AMB EXT PLATELETS: 184
AMB EXT TRIGLYCERIDES: 103 MG/DL
AMB EXT TSH: 0.92 MIU/ML
AMB EXT WBC: 7.9 X10(3)UL

## 2020-01-01 PROCEDURE — 99214 OFFICE O/P EST MOD 30 MIN: CPT | Performed by: NURSE PRACTITIONER

## 2020-01-01 PROCEDURE — 99490 CHRNC CARE MGMT STAFF 1ST 20: CPT

## 2020-01-01 RX ORDER — ALBUTEROL SULFATE 2.5 MG/3ML
2.5 SOLUTION RESPIRATORY (INHALATION) EVERY 4 HOURS PRN
Qty: 50 AMPULE | Refills: 0 | Status: SHIPPED | OUTPATIENT
Start: 2020-01-01

## 2020-01-01 RX ORDER — POLYETHYLENE GLYCOL 3350 17 G/17G
17 POWDER, FOR SOLUTION ORAL DAILY
COMMUNITY

## 2020-01-01 RX ORDER — DOCUSATE SODIUM 100 MG/1
100 CAPSULE, LIQUID FILLED ORAL DAILY
Qty: 90 CAPSULE | Refills: 1 | Status: SHIPPED | OUTPATIENT
Start: 2020-01-01

## 2020-01-08 NOTE — PROGRESS NOTES
Attempted to contact pt's wife no answer not able to leave detailed message for pt to call back. Will try again in a couple days.    Total time spent with patient including chart review: 2  Time spent with patient this month: 2    Total time spent with com

## 2020-01-10 NOTE — TELEPHONE ENCOUNTER
From: Manav Bynum  To: Nelsy Lawrence MD  Sent: 1/10/2020 10:38 AM CST  Subject: Other    Dr. Jose aMres,  I am going to attempt to send you a picture of Pb's bed sore.  It is very difficult for me to get him to your office for an evaluation which is wh

## 2020-01-10 NOTE — TELEPHONE ENCOUNTER
Can we set up home health. He has a pretty clear pressure ulcer. Not able to stage it from the picture.

## 2020-01-13 NOTE — TELEPHONE ENCOUNTER
Patient has appt 1/14/2020 with Alayna Mccauley NP for bed sore so we will have documentation to support wound care with [facility]

## 2020-01-13 NOTE — TELEPHONE ENCOUNTER
Patient has appt 1/13/2020 with Rudy Jordan NP to assess bed sore so we can send order to [facility] per Dr. Ciara Jones M.D.

## 2020-01-13 NOTE — TELEPHONE ENCOUNTER
From: Cliff Muhammad  To: Alec Iqbal MD  Sent: 1/13/2020 10:10 AM CST  Subject: Other    Good Morning Dr. Noa Gonzáles,  Any update on getting the nurse for Mimi Gee? I have been treating it and it is no longer bleeding.     Lucie Res

## 2020-01-14 NOTE — PROGRESS NOTES
Patient presents with:  Lesion: x 2 months, bed sore, has gotten worse       HPI:  Presents with wife Lucie Goins (patient's care giver) with approx  6-8 week history of open wound to right buttock related to decreased mobility from Parkinson's disease.  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 Right buttock noted with several denuded areas, largest measuring approx 1 cm by 6 mm.  All open areas with pink wound bed, minimal serous

## 2020-01-14 NOTE — PATIENT INSTRUCTIONS
Clean wound with saline and gently pat dry. then apply Aquaphor ointment twice daily and cover with clean bandage.

## 2020-01-17 NOTE — TELEPHONE ENCOUNTER
Spoke to Greg from [place] [address] and she wanted to inform Dr. Dejon Guy the pt was admitted to home health today.

## 2020-01-22 NOTE — TELEPHONE ENCOUNTER
Golden Mejia care nurse from [facility] is calling requesting to speak to nurse.  Patient experiencing constipation issues

## 2020-01-22 NOTE — TELEPHONE ENCOUNTER
Please see what reaction to Cathlean [place] is for patient. I think Docusate should be fine but if reaction to Cathlean [place] is anaphylactic shock this would be contraindicated. Please let me know what reaction is. Thanks.

## 2020-01-22 NOTE — TELEPHONE ENCOUNTER
Called and talked to Gisselle Landry this is a milk allergy causes sinus flare up so wife took him off milk sent script in as directed

## 2020-01-22 NOTE — TELEPHONE ENCOUNTER
SheaRN from [facility] states that pt has not had a BM since 1/11/2020. This is normal for pt. Wife states that pt only has a BM every 2-3 weeks. Abdomen is not rigid. Bowel sounds are present. Pt is passing gas.     Pt is taking Miralax daily and Magnesium

## 2020-01-31 NOTE — PROGRESS NOTES
1/31/2020  Spoke to [place] wife for CCM. Updates to patient care team/comments: n/a. Patient reported changes in medications: no changes. Med Adherence  Comment: pt reports taking all medications as prescribed. Health Maintenance:    Your Appo for better balance and walking after rib fractures.       Continue independent living, healthy diet and exercise routine. Continue to provide encouragement, support, and education for healthy coping and dx.   Discussed fall prevention with Nataly.

## 2020-02-03 NOTE — TELEPHONE ENCOUNTER
Referral request [facility]  Fax number: [phone]     Non sterile gloves per box quantity 2   Pull on disposable underwear quantity 200   bladder control pads/inserts quantity 120   disposable underpads/chux quantity 150    Pat

## 2020-02-07 NOTE — PROGRESS NOTES
2/7/2020  Spoke to Davida Pro wife for CCM. Med Adherence  Comment: pt reports taking all medications as prescribed. Health Maintenance:    Your Appointments    Monday March 23, 2020  2:00 PM CDT  Follow Up with Chip Gant MD  [facility] ([facility] history, and need for CCM established by Kusum Neves MD.

## 2020-02-11 NOTE — TELEPHONE ENCOUNTER
Triage: Good Afternoon :)    Pt's wife would like to know if she has to order the DME supplies every time she is out or will they auto fill? Leslye Everett also would like to know if Dr. Manuel Adams can place an order for a toilet commode?   Notified Nataly I will cont

## 2020-02-14 NOTE — TELEPHONE ENCOUNTER
TC to [facility] to discuss patient's need for bedside commode. Patient would like one that the bucket can be removed and place the support system over the toilet so he can get on and off the toilet easier. Left message for [facility] to call me back.

## 2020-02-14 NOTE — TELEPHONE ENCOUNTER
Referral request [facility]  Fax number: [phone]    Bedside commode that can be used over the toilet as well for support sitting down.     Patient seen by Bishop Elina NP 1/14/2020  Office notes from Bishop Elina NP 1/14/2020  Patient Active Pr

## 2020-02-28 NOTE — TELEPHONE ENCOUNTER
Talked to [facility] patient has been having falls had 2 falls last week denies and injury she is looking to get home health and PT/OT social work they also want to order a hospital bed.  Gave OK the will send orders to Dr Pamela Horne

## 2020-02-28 NOTE — TELEPHONE ENCOUNTER
Shea at [facility] calling requesting to speak with nurse regarding patient's care, states was waiting on a call back?

## 2020-03-05 NOTE — TELEPHONE ENCOUNTER
Claudetta Arabia, we received a fax from [address] [place] [address] requesting a hospital bed for Mr. Elidia Rodrigues. This order was requested by [facility]. In order to get insurance approval for the bed your last dictation must include need for hospital bed.   You las

## 2020-03-10 NOTE — PROGRESS NOTES
3/10/2020  Spoke to [place] for CCM. Updates to patient care team/comments: n/a. Patient reported changes in medications: no changes. Med Adherence  Comment: pt reports taking all medications as prescribed. Health Maintenance:    Your Appointme Encounter Total: 20 min medical record review, telephone communication, care plan updates where needed, education, goals and action plan recreation/update. Provided acknowledgment and validation to patient's concerns.    Monthly Minute Total including today

## 2020-03-10 NOTE — TELEPHONE ENCOUNTER
Daja Anderson is calling from [facility] to let Dr. Qing Jade know that patient will be seen for 2x a week for 1 week, 1x a week for 1 week to work on caregiver training and balance.

## 2020-03-11 NOTE — TELEPHONE ENCOUNTER
Niru Or at [facility] called to inform Dr Elver Duffy will be seeing patient once a week for 1 week, just for the initial evaluation.

## 2020-03-11 NOTE — PROGRESS NOTES
Spoke with Jabier Warren from [employer] commode is ready for . Called pt's wife Nataly notified of the above and gave [facility] information for he to .     Total time spent with patient including chart review: 4  Time spent with patient th

## 2020-03-11 NOTE — TELEPHONE ENCOUNTER
Pema Barr with [facility] calling to state that patient has had no bowel movement in 21 days, a little firm in abdomen.  Patient does have enemas at the house, Pema Barr needs an order to authorize the administration of one

## 2020-03-12 NOTE — TELEPHONE ENCOUNTER
Left message notifying [facility]. He is asked to call office with any questions.

## 2020-03-13 NOTE — TELEPHONE ENCOUNTER
Pt is being cared for by chronic care team. He is just due for his appt this month so will wait at this point to call at a later date.

## 2020-03-17 NOTE — TELEPHONE ENCOUNTER
Pt is Non-cognitive and the nurse from [facility] home health is calling because the wife is refusing home health care at this time. Glenny the nurse said to call with questions, she said she has no concerns at this time.  This is just an FYI for the bre

## 2020-03-17 NOTE — TELEPHONE ENCOUNTER
Glenny,physical therapist with [facility] states that wife is canceling therapy today because pt is not able to follow commands at this time. Routed to Dr Gladys Barry.

## 2020-04-13 NOTE — TELEPHONE ENCOUNTER
Lakisha with [facility] calling to get order for hospice evaluation, can call with verbal order to [phone] or fax to [phone]

## 2020-04-13 NOTE — TELEPHONE ENCOUNTER
Madelaine from [facility] calling to inform us that pt is wheezing,has asthma, no shortness of breathe and oxygen is at 93 %.  Maybe we can prescribe Nebulizer as inhaler will not be ok as pt has Parkinson's

## 2020-04-13 NOTE — PROGRESS NOTES
[facility] home health nurse was over today for several hours and they had a deep conversation about pt and his care. Recovering from sinus infection states he gets one every year. Not sure where his wheezing was coming from.      [facility] Nataly states she

## 2020-04-15 NOTE — TELEPHONE ENCOUNTER
Yolis Vu from [place] [address] states that pt was admitted last night to Hospice services (diagnosis is Parkinson's and Dementia) Call her back with any questions

## 2020-04-15 NOTE — TELEPHONE ENCOUNTER
Submitted through [facility]  Key: [id number] – PA Case ID: [id number] – Rx #: [id number]

## 2020-04-15 NOTE — TELEPHONE ENCOUNTER
Received fax from Jose Hutchinson ([phone]) requesting a prior authorization for Albuterol Sulfate (2.5MG/3ML) 0.083% Nebulizer Solution. Called patient no answer/left message on voicemail. Placed fax in Pat's inbox.

## 2020-04-15 NOTE — TELEPHONE ENCOUNTER
Received Faxed response from [phone] regarding PA for Albuterol neb solution  Approved from 01/16/2020 through 04/15/2021  Please call Magda Youssef at [phone] to inform them  Thanks

## 2020-05-21 NOTE — PROGRESS NOTES
Doing ok she put [facility] in [facility].  His health was declining and she decided she needed help. Going well! Notified Myna Heady unfortunately pt will be un enrolled from the CCM program [facility]osp[facility] has taken over all care. I will also notify Dr. Edwardo Santana.

## 2020-06-30 NOTE — TELEPHONE ENCOUNTER
Referral request [facility]  Fax number: [phone]     quantity 120/month   150/month   2 boxes per month   quantity 200 per month    Office notes from Kellie Hendrickson NP 1/14/2020  Patient Active Problem List:     Vitamin D deficie

## 2020-07-28 NOTE — TELEPHONE ENCOUNTER
Referral request [facility]  Fax number: [phone]     quantity [phone] quantity [phone] quantity 2   quantity 200    Office notes from Bere Holden NP 1/14/2020  Patient Active Problem List:     Vitamin D deficiency     Rheumatic fe

## 2020-07-29 NOTE — TELEPHONE ENCOUNTER
From: Adrianna Canales  To: Preethi Felder MD  Sent: 2020 6:31 PM CDT  Subject: Other    Dr. Jenn Erwin. :[phone]  I wanted to let you know that Chiara Pa has sadly reached stage 5 of his Parkinson's.  He is in bed now  and his speech

## 2020-08-04 NOTE — TELEPHONE ENCOUNTER
Pt has stage 5 Parkinson and is in hospice care. DO NOT contact pt to schedule any appointments or Quality measurement follow-up.

## 2020-10-15 NOTE — TELEPHONE ENCOUNTER
Angelina Worthy with [facility] called to state that patient was discharged from their care on 03/11/20 with wound on back having been healed, wife called to state that the wound re-opened and wants them to return
Called and talked to Carey Richmond and patient needs new referral for them to come out to start seeing him again to to reopening of wound.  Dr Jose MALLOY with referral filled out and faxed to [phone]
Yes

## 2023-09-18 NOTE — TELEPHONE ENCOUNTER
Called and talked to home health nurse he has wheezing in all fields unable to use something like PRO air due to parkinson's.   He needs a HHN with medication Detail Level: Detailed

## (undated) NOTE — ED AVS SNAPSHOT
Kiera Marroquin   MRN: [id number]    Department:  THE [facility] Emergency Department in [place]   Date of Visit:  3/26/2019           Disclosure     Insurance plans vary and the physician(s) referred by the ER may not be covered by your plan.  Please conta tell this physician (or your personal doctor if your instructions are to return to your personal doctor) about any new or lasting problems. The primary care or specialist physician will see patients referred from the [facility] Emergency Department.  Arthor Bernheim

## (undated) NOTE — LETTER
August 8, 2017    [address] [place] [address]    Dear Dex Wilson: It was a pleasure speaking with you over the phone recently.  To follow up, I wanted to send you some contact information to utilize when you have a ques Phone: [phone]

## (undated) NOTE — LETTER
August 23, 2017    Corey Garner  [id number] [place] [facility] [place] [address]      Dear Aixa Francis: It was a pleasure speaking with you over the phone recently.  To follow up, I wanted to send you my contact information to utilize when you have a que